# Patient Record
Sex: FEMALE | Race: WHITE | NOT HISPANIC OR LATINO | URBAN - METROPOLITAN AREA
[De-identification: names, ages, dates, MRNs, and addresses within clinical notes are randomized per-mention and may not be internally consistent; named-entity substitution may affect disease eponyms.]

---

## 2017-08-20 ENCOUNTER — EMERGENCY (EMERGENCY)
Facility: HOSPITAL | Age: 82
LOS: 1 days | Discharge: HOME | End: 2017-08-20
Attending: EMERGENCY MEDICINE | Admitting: EMERGENCY MEDICINE
Payer: MEDICARE

## 2017-08-20 VITALS
OXYGEN SATURATION: 99 % | DIASTOLIC BLOOD PRESSURE: 77 MMHG | TEMPERATURE: 99 F | HEART RATE: 86 BPM | SYSTOLIC BLOOD PRESSURE: 151 MMHG | RESPIRATION RATE: 18 BRPM

## 2017-08-20 VITALS
DIASTOLIC BLOOD PRESSURE: 89 MMHG | RESPIRATION RATE: 18 BRPM | SYSTOLIC BLOOD PRESSURE: 164 MMHG | TEMPERATURE: 98 F | HEART RATE: 79 BPM | OXYGEN SATURATION: 96 %

## 2017-08-20 DIAGNOSIS — S01.01XA LACERATION WITHOUT FOREIGN BODY OF SCALP, INITIAL ENCOUNTER: ICD-10-CM
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
## 2017-08-20 DIAGNOSIS — S09.90XA UNSPECIFIED INJURY OF HEAD, INITIAL ENCOUNTER: ICD-10-CM
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
## 2017-08-20 DIAGNOSIS — Z88.0 ALLERGY STATUS TO PENICILLIN: ICD-10-CM
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
## 2017-08-20 LAB
ALBUMIN SERPL ELPH-MCNC: 3.8 G/DL — SIGNIFICANT CHANGE UP (ref 3.3–5)
ALP SERPL-CCNC: 84 U/L — SIGNIFICANT CHANGE UP (ref 40–120)
ALT FLD-CCNC: 16 U/L — SIGNIFICANT CHANGE UP (ref 10–45)
ANION GAP SERPL CALC-SCNC: 11 MMOL/L — SIGNIFICANT CHANGE UP (ref 5–17)
APPEARANCE UR: CLEAR — SIGNIFICANT CHANGE UP
AST SERPL-CCNC: 28 U/L — SIGNIFICANT CHANGE UP (ref 10–40)
BASOPHILS NFR BLD AUTO: 0.3 % — SIGNIFICANT CHANGE UP (ref 0–2)
BILIRUB SERPL-MCNC: 0.2 MG/DL — SIGNIFICANT CHANGE UP (ref 0.2–1.2)
BILIRUB UR-MCNC: NEGATIVE — SIGNIFICANT CHANGE UP
BUN SERPL-MCNC: 25 MG/DL — HIGH (ref 7–23)
CALCIUM SERPL-MCNC: 9.2 MG/DL — SIGNIFICANT CHANGE UP (ref 8.4–10.5)
CHLORIDE SERPL-SCNC: 102 MMOL/L — SIGNIFICANT CHANGE UP (ref 96–108)
CO2 SERPL-SCNC: 26 MMOL/L — SIGNIFICANT CHANGE UP (ref 22–31)
COLOR SPEC: YELLOW — SIGNIFICANT CHANGE UP
CREAT SERPL-MCNC: 0.9 MG/DL — SIGNIFICANT CHANGE UP (ref 0.5–1.3)
DIFF PNL FLD: NEGATIVE — SIGNIFICANT CHANGE UP
EOSINOPHIL NFR BLD AUTO: 1.1 % — SIGNIFICANT CHANGE UP (ref 0–6)
GLUCOSE SERPL-MCNC: 176 MG/DL — HIGH (ref 70–99)
GLUCOSE UR QL: NEGATIVE — SIGNIFICANT CHANGE UP
HCT VFR BLD CALC: 33.3 % — LOW (ref 34.5–45)
HGB BLD-MCNC: 10.9 G/DL — LOW (ref 11.5–15.5)
KETONES UR-MCNC: NEGATIVE — SIGNIFICANT CHANGE UP
LEUKOCYTE ESTERASE UR-ACNC: NEGATIVE — SIGNIFICANT CHANGE UP
LYMPHOCYTES # BLD AUTO: 23.3 % — SIGNIFICANT CHANGE UP (ref 13–44)
MCHC RBC-ENTMCNC: 30.6 PG — SIGNIFICANT CHANGE UP (ref 27–34)
MCHC RBC-ENTMCNC: 32.7 G/DL — SIGNIFICANT CHANGE UP (ref 32–36)
MCV RBC AUTO: 93.5 FL — SIGNIFICANT CHANGE UP (ref 80–100)
MONOCYTES NFR BLD AUTO: 7.5 % — SIGNIFICANT CHANGE UP (ref 2–14)
NEUTROPHILS NFR BLD AUTO: 67.8 % — SIGNIFICANT CHANGE UP (ref 43–77)
NITRITE UR-MCNC: NEGATIVE — SIGNIFICANT CHANGE UP
PH UR: 5 — SIGNIFICANT CHANGE UP (ref 5–8)
PLATELET # BLD AUTO: 309 K/UL — SIGNIFICANT CHANGE UP (ref 150–400)
POTASSIUM SERPL-MCNC: 4.5 MMOL/L — SIGNIFICANT CHANGE UP (ref 3.5–5.3)
POTASSIUM SERPL-SCNC: 4.5 MMOL/L — SIGNIFICANT CHANGE UP (ref 3.5–5.3)
PROT SERPL-MCNC: 6.8 G/DL — SIGNIFICANT CHANGE UP (ref 6–8.3)
PROT UR-MCNC: NEGATIVE MG/DL — SIGNIFICANT CHANGE UP
RBC # BLD: 3.56 M/UL — LOW (ref 3.8–5.2)
RBC # FLD: 13.9 % — SIGNIFICANT CHANGE UP (ref 10.3–16.9)
SODIUM SERPL-SCNC: 139 MMOL/L — SIGNIFICANT CHANGE UP (ref 135–145)
SP GR SPEC: 1.01 — SIGNIFICANT CHANGE UP (ref 1–1.03)
TROPONIN T SERPL-MCNC: <0.01 NG/ML — SIGNIFICANT CHANGE UP (ref 0–0.01)
UROBILINOGEN FLD QL: 0.2 E.U./DL — SIGNIFICANT CHANGE UP
WBC # BLD: 6.3 K/UL — SIGNIFICANT CHANGE UP (ref 3.8–10.5)
WBC # FLD AUTO: 6.3 K/UL — SIGNIFICANT CHANGE UP (ref 3.8–10.5)

## 2017-08-20 PROCEDURE — 84484 ASSAY OF TROPONIN QUANT: CPT

## 2017-08-20 PROCEDURE — 36415 COLL VENOUS BLD VENIPUNCTURE: CPT

## 2017-08-20 PROCEDURE — 70450 CT HEAD/BRAIN W/O DYE: CPT | Mod: 26

## 2017-08-20 PROCEDURE — 99284 EMERGENCY DEPT VISIT MOD MDM: CPT | Mod: 25

## 2017-08-20 PROCEDURE — 85025 COMPLETE CBC W/AUTO DIFF WBC: CPT

## 2017-08-20 PROCEDURE — 80053 COMPREHEN METABOLIC PANEL: CPT

## 2017-08-20 PROCEDURE — 72125 CT NECK SPINE W/O DYE: CPT

## 2017-08-20 PROCEDURE — 81003 URINALYSIS AUTO W/O SCOPE: CPT

## 2017-08-20 PROCEDURE — 70450 CT HEAD/BRAIN W/O DYE: CPT

## 2017-08-20 PROCEDURE — 93005 ELECTROCARDIOGRAM TRACING: CPT

## 2017-08-20 PROCEDURE — 93010 ELECTROCARDIOGRAM REPORT: CPT

## 2017-08-20 PROCEDURE — 87086 URINE CULTURE/COLONY COUNT: CPT

## 2017-08-20 PROCEDURE — 99285 EMERGENCY DEPT VISIT HI MDM: CPT | Mod: 25

## 2017-08-20 PROCEDURE — 72125 CT NECK SPINE W/O DYE: CPT | Mod: 26

## 2017-08-20 NOTE — ED PROVIDER NOTE - PHYSICAL EXAMINATION
CONSTITUTIONAL: Well-appearing; well-nourished; in no apparent distress.   HEAD: 2cm lac to posterior scalp, mild oozing  EYES:  conjunctiva and sclera clear  ENT: normal nose; no rhinorrhea; normal pharynx with no erythema or lesions.   NECK: Supple; non-tender;   CARDIOVASCULAR: Normal S1, S2; no murmurs, rubs, or gallops. Regular rate and rhythm.   RESPIRATORY: Breathing easily; breath sounds clear and equal bilaterally; no wheezes, rhonchi, or rales.  GI: Soft; non-distended; non-tender; no palpable organomegaly.   EXT: No cyanosis or edema; N/V intact  SKIN: Normal for age and race; warm; dry; good turgor; no apparent lesions or rash.   NEURO: A & O x 3; face symmetric; grossly unremarkable.   PSYCHOLOGICAL: The patient’s mood and manner are appropriate.

## 2017-08-20 NOTE — ED PROVIDER NOTE - OBJECTIVE STATEMENT
89yo F here w/ fall today. +laceration to posterior scalp. 89yo F here w/ fall today. +laceration to posterior scalp. Lives alone, refusing HHA. Family thinks she has mild dementia and have taken over her finances but pt still cleans and cooks for herself. They call daily but not always able to physically come and check on her daily. Pt called them today and stated that she fell in the bathroom. At time of call she states that she slipped, and they corroborate that her bathroom is tiny and easy to fall in. However also concerned that she may not be eating well or minimizing symptoms. Have discussed getting help at home but pt always declines. Not leaving stove on, and pt still able to keep apartment clean and maintain her hygiene well.

## 2017-08-20 NOTE — ED ADULT NURSE NOTE - DISCHARGE TEACHING
Garrett, pt aware of stitches removal f/u , and referral for home care, pt and family verbalized understanding Garrett, pt aware of staples removal f/u in one week, and referral for home care, pt and family verbalized understanding

## 2017-08-20 NOTE — ED PROVIDER NOTE - CARE PLAN
Principal Discharge DX:	Head injury due to trauma, initial encounter  Secondary Diagnosis:	Scalp laceration, initial encounter

## 2017-08-20 NOTE — ED ADULT NURSE NOTE - CHPI ED SYMPTOMS NEG
no deformity/no vomiting/no loss of consciousness/no numbness/no tingling/no fever/no abrasion/no weakness

## 2017-08-20 NOTE — ED PROVIDER NOTE - MEDICAL DECISION MAKING DETAILS
here w/ fall w/ resultant head injury. workup in Ed negative. here w/ daughter and son in law. they feel like she is still safe at home, but concerned that she is at the point where home services would be helpful. I spoke w/ social work who will forward case to case management. DC home in NAD with strict return precautions given.

## 2017-08-20 NOTE — ED ADULT NURSE NOTE - OBJECTIVE STATEMENT
PT came to ED with adult children following mechanical fall in bathroom today. Pt states she lost her balance when she was getting up from toilet. Pt states she fell and hit back of her head and left shoulder.  PT denies LOC, small lac noted on occipital region, minor bleeding to site. Pt report 2/10 pain to left shoulder. Positive PMS x4 extremities, with full ROM. Pt denies chest pain, SOB, abd pain, /GI symptoms.  Per pt's daughter patient has baseline memory loss over the last several years. PT currently alert and oriented x3. Able to communicate in English with ED staff.  Daughter at bedside.

## 2017-08-20 NOTE — ED ADULT TRIAGE NOTE - CHIEF COMPLAINT QUOTE
fell in the bathroom this am and hit back of head ,small hematoma and abrasion on scalp ,pt denies any pain or dizziness

## 2017-08-21 LAB
CULTURE RESULTS: SIGNIFICANT CHANGE UP
SPECIMEN SOURCE: SIGNIFICANT CHANGE UP

## 2018-04-12 ENCOUNTER — INPATIENT (INPATIENT)
Facility: HOSPITAL | Age: 83
LOS: 10 days | Discharge: NURSING, REHAB OR LONG-TERM CARE | DRG: 377 | End: 2018-04-23
Attending: SPECIALIST | Admitting: SPECIALIST
Payer: MEDICARE

## 2018-04-12 VITALS
DIASTOLIC BLOOD PRESSURE: 50 MMHG | WEIGHT: 100.09 LBS | OXYGEN SATURATION: 99 % | HEIGHT: 60 IN | TEMPERATURE: 97 F | RESPIRATION RATE: 18 BRPM | SYSTOLIC BLOOD PRESSURE: 105 MMHG | HEART RATE: 76 BPM

## 2018-04-12 DIAGNOSIS — M48.00 SPINAL STENOSIS, SITE UNSPECIFIED: ICD-10-CM

## 2018-04-12 DIAGNOSIS — R63.8 OTHER SYMPTOMS AND SIGNS CONCERNING FOOD AND FLUID INTAKE: ICD-10-CM

## 2018-04-12 DIAGNOSIS — M06.9 RHEUMATOID ARTHRITIS, UNSPECIFIED: ICD-10-CM

## 2018-04-12 DIAGNOSIS — D64.9 ANEMIA, UNSPECIFIED: ICD-10-CM

## 2018-04-12 DIAGNOSIS — I10 ESSENTIAL (PRIMARY) HYPERTENSION: ICD-10-CM

## 2018-04-12 LAB
ALBUMIN SERPL ELPH-MCNC: 3.6 G/DL — SIGNIFICANT CHANGE UP (ref 3.3–5)
ALP SERPL-CCNC: 66 U/L — SIGNIFICANT CHANGE UP (ref 40–120)
ALT FLD-CCNC: 15 U/L — SIGNIFICANT CHANGE UP (ref 10–45)
ANION GAP SERPL CALC-SCNC: 12 MMOL/L — SIGNIFICANT CHANGE UP (ref 5–17)
APTT BLD: 28.5 SEC — SIGNIFICANT CHANGE UP (ref 27.5–37.4)
AST SERPL-CCNC: 27 U/L — SIGNIFICANT CHANGE UP (ref 10–40)
BILIRUB SERPL-MCNC: <0.2 MG/DL — SIGNIFICANT CHANGE UP (ref 0.2–1.2)
BLD GP AB SCN SERPL QL: NEGATIVE — SIGNIFICANT CHANGE UP
BUN SERPL-MCNC: 40 MG/DL — HIGH (ref 7–23)
CALCIUM SERPL-MCNC: 9.3 MG/DL — SIGNIFICANT CHANGE UP (ref 8.4–10.5)
CHLORIDE SERPL-SCNC: 103 MMOL/L — SIGNIFICANT CHANGE UP (ref 96–108)
CO2 SERPL-SCNC: 24 MMOL/L — SIGNIFICANT CHANGE UP (ref 22–31)
CREAT SERPL-MCNC: 1.1 MG/DL — SIGNIFICANT CHANGE UP (ref 0.5–1.3)
EXTRA SST TUBE: SIGNIFICANT CHANGE UP
GLUCOSE SERPL-MCNC: 123 MG/DL — HIGH (ref 70–99)
HCT VFR BLD CALC: 22.4 % — LOW (ref 34.5–45)
HGB BLD-MCNC: 7.1 G/DL — LOW (ref 11.5–15.5)
INR BLD: 0.97 — SIGNIFICANT CHANGE UP (ref 0.88–1.16)
LYMPHOCYTES # BLD AUTO: 11 % — LOW (ref 13–44)
MCHC RBC-ENTMCNC: 28.2 PG — SIGNIFICANT CHANGE UP (ref 27–34)
MCHC RBC-ENTMCNC: 31.7 G/DL — LOW (ref 32–36)
MCV RBC AUTO: 88.9 FL — SIGNIFICANT CHANGE UP (ref 80–100)
MONOCYTES NFR BLD AUTO: 1 % — LOW (ref 2–14)
NEUTROPHILS NFR BLD AUTO: 86 % — HIGH (ref 43–77)
PLATELET # BLD AUTO: 500 K/UL — HIGH (ref 150–400)
POTASSIUM SERPL-MCNC: 5 MMOL/L — SIGNIFICANT CHANGE UP (ref 3.5–5.3)
POTASSIUM SERPL-SCNC: 5 MMOL/L — SIGNIFICANT CHANGE UP (ref 3.5–5.3)
PROT SERPL-MCNC: 6.6 G/DL — SIGNIFICANT CHANGE UP (ref 6–8.3)
PROTHROM AB SERPL-ACNC: 10.8 SEC — SIGNIFICANT CHANGE UP (ref 9.8–12.7)
RBC # BLD: 2.52 M/UL — LOW (ref 3.8–5.2)
RBC # FLD: 14.9 % — SIGNIFICANT CHANGE UP (ref 10.3–16.9)
RH IG SCN BLD-IMP: POSITIVE — SIGNIFICANT CHANGE UP
SODIUM SERPL-SCNC: 139 MMOL/L — SIGNIFICANT CHANGE UP (ref 135–145)
WBC # BLD: 11.3 K/UL — HIGH (ref 3.8–10.5)
WBC # FLD AUTO: 11.3 K/UL — HIGH (ref 3.8–10.5)

## 2018-04-12 PROCEDURE — 99285 EMERGENCY DEPT VISIT HI MDM: CPT

## 2018-04-12 RX ORDER — ACETAMINOPHEN 500 MG
0 TABLET ORAL
Qty: 0 | Refills: 0 | COMMUNITY

## 2018-04-12 RX ORDER — SODIUM CHLORIDE 9 MG/ML
1000 INJECTION INTRAMUSCULAR; INTRAVENOUS; SUBCUTANEOUS ONCE
Qty: 0 | Refills: 0 | Status: COMPLETED | OUTPATIENT
Start: 2018-04-12 | End: 2018-04-12

## 2018-04-12 RX ORDER — PANTOPRAZOLE SODIUM 20 MG/1
40 TABLET, DELAYED RELEASE ORAL
Qty: 0 | Refills: 0 | Status: DISCONTINUED | OUTPATIENT
Start: 2018-04-12 | End: 2018-04-23

## 2018-04-12 RX ORDER — LOSARTAN POTASSIUM 100 MG/1
1 TABLET, FILM COATED ORAL
Qty: 0 | Refills: 0 | COMMUNITY

## 2018-04-12 RX ORDER — DULOXETINE HYDROCHLORIDE 30 MG/1
60 CAPSULE, DELAYED RELEASE ORAL
Qty: 0 | Refills: 0 | COMMUNITY

## 2018-04-12 RX ORDER — CELECOXIB 200 MG/1
0 CAPSULE ORAL
Qty: 0 | Refills: 0 | COMMUNITY

## 2018-04-12 RX ORDER — ACETAMINOPHEN 500 MG
650 TABLET ORAL ONCE
Qty: 0 | Refills: 0 | Status: COMPLETED | OUTPATIENT
Start: 2018-04-12 | End: 2018-04-12

## 2018-04-12 RX ORDER — ACETAMINOPHEN 500 MG
325 TABLET ORAL
Qty: 0 | Refills: 0 | COMMUNITY

## 2018-04-12 RX ADMIN — SODIUM CHLORIDE 2000 MILLILITER(S): 9 INJECTION INTRAMUSCULAR; INTRAVENOUS; SUBCUTANEOUS at 16:43

## 2018-04-12 RX ADMIN — Medication 650 MILLIGRAM(S): at 17:38

## 2018-04-12 RX ADMIN — SODIUM CHLORIDE 2000 MILLILITER(S): 9 INJECTION INTRAMUSCULAR; INTRAVENOUS; SUBCUTANEOUS at 17:38

## 2018-04-12 NOTE — ED ADULT TRIAGE NOTE - OTHER COMPLAINTS
Pt with weakness, no CP/SOB, no blood in vomit or stool Pt with weakness, no CP/SOB, no blood in vomit or stool; no blood thinnners

## 2018-04-12 NOTE — PROGRESS NOTE ADULT - SUBJECTIVE AND OBJECTIVE BOX
GERONIMO MACK    HPI:89 yo f with pmx of alzheimers, htn, spinal stenosis went for routine exam by pcp and was found to be anemic so sent to er.  no c/o abd pain/blood in stool/n/v      Allergies    penicillin (Unknown)    Intolerances        sodium chloride 0.9% Bolus 1000 milliLiter(s) IV Bolus once      Vital Signs Last 24 Hrs  T(C): 36.6 (12 Apr 2018 16:22), Max: 36.6 (12 Apr 2018 16:22)  T(F): 97.9 (12 Apr 2018 16:22), Max: 97.9 (12 Apr 2018 16:22)  HR: 63 (12 Apr 2018 16:22) (63 - 76)  BP: 134/71 (12 Apr 2018 16:22) (105/50 - 134/71)  BP(mean): --  RR: 18 (12 Apr 2018 16:22) (18 - 18)  SpO2: 100% (12 Apr 2018 16:22) (99% - 100%)    PHYSICAL EXAM:      Constitutional:nad    Respiratory:cta    Cardiovascular:[id number]    Gastrointestinal:soft nt bs    Rectal:formed stools  gpos      A/P    transfuse prbc to get hgb >8  no nsaids  for egd/colon mon am

## 2018-04-12 NOTE — H&P ADULT - PROBLEM SELECTOR PLAN 1
-Hb 7.1 in setting of positive FOBT; evaluated by Dr. Quezada in ED who scheduled patient for colonoscopy on Monday morning at 8:30 AM  -clear liquid diet until c-scope  -ordered for 1unit PRBCs, transfusion goal >8  -f/u post-transfusion CBC  -NPO after midnight Sunday -Hb 7.1 in setting of positive FOBT; evaluated by Dr. Quezada in ED who scheduled patient for colonoscopy on Monday morning at 8:30 AM  -full liquid diet until Sunday, then transition to clear liquids and Golytely prep  -protonix 40 PO daily per Dr. Quezada  -ordered for 1unit PRBCs, transfusion goal >8  -f/u post-transfusion CBC  -NPO after midnight Sunday

## 2018-04-12 NOTE — ED PROVIDER NOTE - ATTENDING CONTRIBUTION TO CARE
PT sent from PCP for anemia, feeling tired for months, no sob.  Denies gi bleeding or other blood loss.  Pt well, low nl bp.  HR stable.  Pt pale, nad, belly nontender.  Plan labs, likely admit for tx and bowman.

## 2018-04-12 NOTE — H&P ADULT - NSHPSOCIALHISTORY_GEN_ALL_CORE
Never smoked  No alcohol use  No drug use  Lives alone at home with home aide 6 days/week  Ambulates w/ and w/o cane

## 2018-04-12 NOTE — H&P ADULT - ASSESSMENT
89F PMH spinal stenosis, RA, HTN, Alzheimer's presented to [facility] from outpatient clinic with low hemoglobin, found to have Hb 7.1 with positive FOBT, scheduled for colonoscopy on Monday.

## 2018-04-12 NOTE — H&P ADULT - NSHPLABSRESULTS_GEN_ALL_CORE
.  LABS:                         7.1    11.3  )-----------( 500      ( 12 Apr 2018 16:19 )             22.4     04-12    139  |  103  |  40<H>  ----------------------------<  123<H>  5.0   |  24  |  1.10    Ca    9.3      12 Apr 2018 16:19    TPro  6.6  /  Alb  3.6  /  TBili  <0.2  /  DBili  x   /  AST  27  /  ALT  15  /  AlkPhos  66  04-12    PT/INR - ( 12 Apr 2018 16:19 )   PT: 10.8 sec;   INR: 0.97          PTT - ( 12 Apr 2018 16:19 )  PTT:28.5 sec              RADIOLOGY, EKG & ADDITIONAL TESTS: Reviewed.

## 2018-04-12 NOTE — H&P ADULT - PMH
Diverticulitis of colon  Diverticulitis  Rheumatoid arthritis Alzheimer disease    Diverticulitis of colon  Diverticulitis  Hypertension    Rheumatoid arthritis    Spinal stenosis

## 2018-04-12 NOTE — ED PROVIDER NOTE - MEDICAL DECISION MAKING DETAILS
89 yo F with pmh of alzheimers dz and RA sent in by Dr. Sow for Hgb of 6.4.  Admits to fatigue. No BRBPR. Pt pale, thin, NAD. Cardio rrr, nml s1s2. Lungs cta b/l. Labs, consent for blood transfusion.

## 2018-04-12 NOTE — H&P ADULT - PROBLEM SELECTOR PLAN 5
-clear liquid diet; NPO after 12AM Monday  -Replete lytes PRN  -No IVFs  -no HSQ in setting of bleed    Dispo: [facility] -full liquid diet; NPO after 12AM Monday  -Replete lytes PRN  -No IVFs  -no HSQ in setting of bleed    Dispo: [facility]

## 2018-04-12 NOTE — H&P ADULT - PROBLEM SELECTOR PLAN 2
-c/w tylenol 325 mg q6h PRN for moderate pain  -does not follow with outpatient rheumatologist  -has taken Celebrex in past but per daughter, has not used in a long time, unable to specify when d/c-ed

## 2018-04-12 NOTE — H&P ADULT - HISTORY OF PRESENT ILLNESS
89F PMH 89F PMH spinal stenosis, RA, HTN, Alzheimer's presented to [facility] from outpatient clinic with low hemoglobin. History primarily obtained from patient's son in law and patient. Patient was at routine check up earlier today, found to have low hemoglobin with no signs of bleeding noted at home. She has been weak for the past day, however no blood noted in the stool, no darkened stools, no hematemesis/hemoptysis. Denies symptoms of dizziness, headache, SOB, chest pain, abdominal pain, N/V, fevers chills. Has not taken NSAIDs, not on any blood thinners at home. Takes tylenol for arthritic pain and spinal stenosis pain, which is currently not well controlled as she is laying in bed uncomfortably. Has never been transfused blood, never had blood in the stool. Decreased PO intake recently due to lack of sleep/ decreased taste sensation. Patient fell at home 8 days ago, has bruise L forehead. Family has not noticed any neurological deficits or change in mental status. On assessment by GI in the ED, FOBT was positive, and patient admitted for colonoscopy on  Monday.     In the ED, VS were T 97, HR 76, /50, RR 18, O2 99% on RA. Labs significant for Hb 7.1, WBC 11.3, 86% neutrophils, normal coag profile, BUN 40.  Given 650mg tylenol, 1L NS bolus, ordered for 1unit RBCs.

## 2018-04-12 NOTE — ED PROVIDER NOTE - OBJECTIVE STATEMENT
89 yo F with pmh of alzheimers dz and RA sent in by Dr. Sow for Hgb of 6.4. Pt had routine blood work yesterday. Reports feeling tired for months. Denies cp, sob, ha, dizziness, n/v, blood in the stool, hematuria or dark stools. Denies hx of transfusion in the past.

## 2018-04-12 NOTE — H&P ADULT - NSHPPHYSICALEXAM_GEN_ALL_CORE
.  VITAL SIGNS:  T(C): 36.6 (04-12-18 @ 16:22), Max: 36.6 (04-12-18 @ 16:22)  T(F): 97.9 (04-12-18 @ 16:22), Max: 97.9 (04-12-18 @ 16:22)  HR: 63 (04-12-18 @ 16:22) (63 - 76)  BP: 134/71 (04-12-18 @ 16:22) (105/50 - 134/71)  BP(mean): --  RR: 18 (04-12-18 @ 16:22) (18 - 18)  SpO2: 100% (04-12-18 @ 16:22) (99% - 100%)  Wt(kg): --    PHYSICAL EXAM:    Constitutional: WDWN resting comfortably in bed; NAD  Head: NC/AT  Eyes: PERRL, EOMI, anicteric sclera  ENT: no nasal discharge; uvula midline, no oropharyngeal erythema or exudates; MMM  Neck: supple; no JVD or thyromegaly  Respiratory: CTA B/L; no W/R/R, no retractions  Cardiac: +S1/S2; RRR; no M/R/G; PMI non-displaced  Gastrointestinal: abdomen soft, NT/ND; no rebound or guarding; +BSx4  Back: spine midline, no bony tenderness or step-offs; no CVAT B/L  Extremities: WWP, no clubbing or cyanosis; no peripheral edema  Musculoskeletal: NROM x4; no joint swelling, tenderness or erythema  Vascular: 2+ radial, femoral, DP/PT pulses B/L  Dermatologic: skin warm, dry and intact; no rashes, wounds, or scars  Lymphatic: no submandibular or cervical LAD  Neurologic: AAOx3; CNII-XII grossly intact; no focal deficits  Psychiatric: affect and characteristics of appearance, verbalizations, behaviors are appropriate .  VITAL SIGNS:  T(C): 36.6 (04-12-18 @ 16:22), Max: 36.6 (04-12-18 @ 16:22)  T(F): 97.9 (04-12-18 @ 16:22), Max: 97.9 (04-12-18 @ 16:22)  HR: 63 (04-12-18 @ 16:22) (63 - 76)  BP: 134/71 (04-12-18 @ 16:22) (105/50 - 134/71)  BP(mean): --  RR: 18 (04-12-18 @ 16:22) (18 - 18)  SpO2: 100% (04-12-18 @ 16:22) (99% - 100%)  Wt(kg): --    PHYSICAL EXAM:  Constitutional: WDWN resting comfortably in bed; NAD  Head: healing bruise/erosion over L temporal region near lateral eyebrow  Eyes: PERRL, EOMI, anicteric sclera  ENT: no nasal discharge; uvula midline, no oropharyngeal erythema or exudates; DMM, no sublingual icterus  Neck: supple; no JVD or thyromegaly  Respiratory: mild bibasilar crackles; no W/R/R, no retractions  Cardiac: +S1/S2; RRR; no M/R/G; PMI non-displaced  Gastrointestinal: abdomen soft, NT/ND; no rebound or guarding; +BSx4  Back: tender to palpation of lower back  Extremities: WWP, no clubbing or cyanosis; no peripheral edema  Musculoskeletal: NROM x4; no joint swelling, tenderness or erythema  Vascular: 2+ radial, femoral, DP/PT pulses B/L  Dermatologic: skin warm, dry and intact; no rashes, wounds, or scars  Lymphatic: no submandibular or cervical LAD  Neurologic: AAOx3; CNII-XII grossly intact; no focal deficits  Psychiatric: affect and characteristics of appearance, verbalizations, behaviors are appropriate

## 2018-04-12 NOTE — H&P ADULT - PROBLEM SELECTOR PLAN 3
-c/w tylenol 325 mg q6h PRN for moderate pain, mostly in lower back sometimes radiates to calves  -pain is more tolerable when patient is sitting up in chair

## 2018-04-12 NOTE — ED PROVIDER NOTE - PHYSICAL EXAMINATION
CONSTITUTIONAL: thin elderly female sitting in bed, pale, NAD   HEAD: Normocephalic; atraumatic.   EYES: PERRL; EOM intact; pale conjunctiva   ENT: normal nose; no rhinorrhea; normal pharynx with no erythema or lesions.   NECK: Supple; non-tender; no LAD  CARDIOVASCULAR: Normal S1, S2; no murmurs, rubs, or gallops. Regular rate and rhythm.   RESPIRATORY: Breathing easily; breath sounds clear and equal bilaterally; no wheezes, rhonchi, or rales.  GI: Soft; non-distended; non-tender; no palpable organomegaly.   MSK: FROM at all extremities, normal tone   EXT: No cyanosis or edema; N/V intact  SKIN: Normal for age and race; warm; dry; good turgor; no apparent lesions or rash.

## 2018-04-12 NOTE — ED ADULT NURSE NOTE - CHPI ED SYMPTOMS NEG
no chills/no fever/no cough/no syncope/no nausea/no vomiting/no chest pain/no shortness of breath/no back pain/no diaphoresis

## 2018-04-13 LAB
ANION GAP SERPL CALC-SCNC: 12 MMOL/L — SIGNIFICANT CHANGE UP (ref 5–17)
BASOPHILS NFR BLD AUTO: 0.2 % — SIGNIFICANT CHANGE UP (ref 0–2)
BLD GP AB SCN SERPL QL: NEGATIVE — SIGNIFICANT CHANGE UP
BUN SERPL-MCNC: 22 MG/DL — SIGNIFICANT CHANGE UP (ref 7–23)
CALCIUM SERPL-MCNC: 8.4 MG/DL — SIGNIFICANT CHANGE UP (ref 8.4–10.5)
CHLORIDE SERPL-SCNC: 99 MMOL/L — SIGNIFICANT CHANGE UP (ref 96–108)
CO2 SERPL-SCNC: 23 MMOL/L — SIGNIFICANT CHANGE UP (ref 22–31)
CREAT SERPL-MCNC: 0.76 MG/DL — SIGNIFICANT CHANGE UP (ref 0.5–1.3)
EOSINOPHIL NFR BLD AUTO: 0.2 % — SIGNIFICANT CHANGE UP (ref 0–6)
GLUCOSE SERPL-MCNC: 116 MG/DL — HIGH (ref 70–99)
HCT VFR BLD CALC: 24.6 % — LOW (ref 34.5–45)
HCT VFR BLD CALC: 29.1 % — LOW (ref 34.5–45)
HGB BLD-MCNC: 7.8 G/DL — LOW (ref 11.5–15.5)
HGB BLD-MCNC: 9.4 G/DL — LOW (ref 11.5–15.5)
LYMPHOCYTES # BLD AUTO: 11.6 % — LOW (ref 13–44)
MAGNESIUM SERPL-MCNC: 2 MG/DL — SIGNIFICANT CHANGE UP (ref 1.6–2.6)
MCHC RBC-ENTMCNC: 28.1 PG — SIGNIFICANT CHANGE UP (ref 27–34)
MCHC RBC-ENTMCNC: 28.3 PG — SIGNIFICANT CHANGE UP (ref 27–34)
MCHC RBC-ENTMCNC: 31.7 G/DL — LOW (ref 32–36)
MCHC RBC-ENTMCNC: 32.3 G/DL — SIGNIFICANT CHANGE UP (ref 32–36)
MCV RBC AUTO: 87.1 FL — SIGNIFICANT CHANGE UP (ref 80–100)
MCV RBC AUTO: 89.1 FL — SIGNIFICANT CHANGE UP (ref 80–100)
MONOCYTES NFR BLD AUTO: 5.1 % — SIGNIFICANT CHANGE UP (ref 2–14)
NEUTROPHILS NFR BLD AUTO: 82.9 % — HIGH (ref 43–77)
PLATELET # BLD AUTO: 405 K/UL — HIGH (ref 150–400)
PLATELET # BLD AUTO: 410 K/UL — HIGH (ref 150–400)
POTASSIUM SERPL-MCNC: 4.3 MMOL/L — SIGNIFICANT CHANGE UP (ref 3.5–5.3)
POTASSIUM SERPL-SCNC: 4.3 MMOL/L — SIGNIFICANT CHANGE UP (ref 3.5–5.3)
RBC # BLD: 2.76 M/UL — LOW (ref 3.8–5.2)
RBC # BLD: 3.34 M/UL — LOW (ref 3.8–5.2)
RBC # FLD: 14.2 % — SIGNIFICANT CHANGE UP (ref 10.3–16.9)
RBC # FLD: 14.5 % — SIGNIFICANT CHANGE UP (ref 10.3–16.9)
RH IG SCN BLD-IMP: POSITIVE — SIGNIFICANT CHANGE UP
SODIUM SERPL-SCNC: 134 MMOL/L — LOW (ref 135–145)
WBC # BLD: 10.7 K/UL — HIGH (ref 3.8–10.5)
WBC # BLD: 9.7 K/UL — SIGNIFICANT CHANGE UP (ref 3.8–10.5)
WBC # FLD AUTO: 10.7 K/UL — HIGH (ref 3.8–10.5)
WBC # FLD AUTO: 9.7 K/UL — SIGNIFICANT CHANGE UP (ref 3.8–10.5)

## 2018-04-13 PROCEDURE — 71045 X-RAY EXAM CHEST 1 VIEW: CPT | Mod: 26

## 2018-04-13 RX ORDER — SOD SULF/SODIUM/NAHCO3/KCL/PEG
4000 SOLUTION, RECONSTITUTED, ORAL ORAL ONCE
Qty: 0 | Refills: 0 | Status: COMPLETED | OUTPATIENT
Start: 2018-04-15 | End: 2018-04-15

## 2018-04-13 RX ORDER — ACETAMINOPHEN 500 MG
325 TABLET ORAL EVERY 6 HOURS
Qty: 0 | Refills: 0 | Status: DISCONTINUED | OUTPATIENT
Start: 2018-04-13 | End: 2018-04-21

## 2018-04-13 RX ORDER — ACETAMINOPHEN 500 MG
325 TABLET ORAL ONCE
Qty: 0 | Refills: 0 | Status: COMPLETED | OUTPATIENT
Start: 2018-04-13 | End: 2018-04-13

## 2018-04-13 RX ADMIN — Medication 325 MILLIGRAM(S): at 04:13

## 2018-04-13 RX ADMIN — Medication 325 MILLIGRAM(S): at 05:00

## 2018-04-13 RX ADMIN — PANTOPRAZOLE SODIUM 40 MILLIGRAM(S): 20 TABLET, DELAYED RELEASE ORAL at 06:13

## 2018-04-13 RX ADMIN — Medication 325 MILLIGRAM(S): at 03:30

## 2018-04-13 RX ADMIN — Medication 325 MILLIGRAM(S): at 02:45

## 2018-04-13 NOTE — PROGRESS NOTE ADULT - ASSESSMENT
89F PMH spinal stenosis, RA, HTN, Alzheimer's presented to [facility] from outpatient clinic with low hemoglobin, found to have Hb 7.1 with positive FOBT, scheduled for colonoscopy on Monday.

## 2018-04-13 NOTE — PROGRESS NOTE ADULT - SUBJECTIVE AND OBJECTIVE BOX
GERONIMO MACK    HPI:  89F [facility] spinal stenosis, RA, HTN, Alzheimer's presented to [facility] from outpatient clinic with low hemoglobin. History primarily obtained from patient's son in law and patient. Patient was at routine check up earlier today, found to have low hemoglobin with no signs of bleeding noted at home. She has been weak for the past day, however no blood noted in the stool, no darkened stools, no hematemesis/hemoptysis. Denies symptoms of dizziness, headache, SOB, chest pain, abdominal pain, N/V, fevers chills. Has not taken NSAIDs, not on any blood thinners at home. Takes tylenol for arthritic pain and spinal stenosis pain, which is currently not well controlled as she is laying in bed uncomfortably. Has never been transfused blood, never had blood in the stool. Decreased PO intake recently due to lack of sleep/ decreased taste sensation. Patient fell at home 8 days ago, has bruise L forehead. Family has not noticed any neurological deficits or change in mental status. On assessment by GI in the ED, FOBT was positive, and patient admitted for colonoscopy on  Monday.     In the ED, VS were T 97, HR 76, /50, RR 18, O2 99% on RA. Labs significant for Hb 7.1, WBC 11.3, 86% neutrophils, normal coag profile, BUN 40.  Given 650mg tylenol, 1L NS bolus, ordered for 1unit RBCs. (12 Apr 2018 18:31)    s/p prbc.  noacute events    Allergies    penicillin (Unknown)    Intolerances        acetaminophen   Tablet. 325 milliGRAM(s) Oral every 6 hours PRN  pantoprazole    Tablet 40 milliGRAM(s) Oral before breakfast      Vital Signs Last 24 Hrs  T(C): 36.9 (13 Apr 2018 05:10), Max: 37.2 (12 Apr 2018 22:07)  T(F): 98.5 (13 Apr 2018 05:10), Max: 99 (12 Apr 2018 22:07)  HR: 69 (13 Apr 2018 05:10) (63 - 82)  BP: 145/73 (13 Apr 2018 05:10) (105/50 - 167/82)  BP(mean): --  RR: 19 (13 Apr 2018 05:10) (17 - 19)  SpO2: 99% (13 Apr 2018 05:10) (99% - 100%)    PHYSICAL EXAM:      Constitutional:nad    Respiratory:cta    Cardiovascular:[id number]    Gastrointestinal:soft nt                                7.8    9.7   )-----------( 405      ( 13 Apr 2018 02:35 )             24.6       04-12    139  |  103  |  40<H>  ----------------------------<  123<H>  5.0   |  24  |  1.10    Ca    9.3      12 Apr 2018 16:19    TPro  6.6  /  Alb  3.6  /  TBili  <0.2  /  DBili  x   /  AST  27  /  ALT  15  /  AlkPhos  66  04-12      A/P  monitor h/h.    transfuse to get hgb>8.5  po ppi  egd/colon to be scheduled for mon

## 2018-04-13 NOTE — PROGRESS NOTE ADULT - PROBLEM SELECTOR PLAN 2
-c/w tylenol 325 mg q6h PRN for moderate pain  -does not follow with outpatient rheumatologist  -has taken Celebrex in past but per daughter, has not used in a long time, unable to specify when d/c-ed  -per Dr. Sow, may have been taking meloxicam at home

## 2018-04-13 NOTE — PROGRESS NOTE ADULT - PROBLEM SELECTOR PLAN 5
-full liquid diet; NPO after 12AM Monday  -Replete lytes PRN  -No IVFs  -no HSQ in setting of bleed    Dispo: [facility]

## 2018-04-13 NOTE — PROGRESS NOTE ADULT - PROBLEM SELECTOR PLAN 1
-Hb 7.1 in setting of positive FOBT; evaluated by Dr. Quezada in ED who scheduled patient for colonoscopy on Monday morning at 8:30 AM  -full liquid diet until Sunday, then transition to clear liquids and Golytely prep  -protonix 40 PO daily per Dr. Quezada  -s/p 2u RBCs, transfusion goal >8.5  -f/u post-transfusion CBC  -NPO after midnight Sunday  -f/u iron studies

## 2018-04-13 NOTE — PROGRESS NOTE ADULT - SUBJECTIVE AND OBJECTIVE BOX
INTERVAL HPI/OVERNIGHT EVENTS:  Transfused 2u hgb overnight. Continued back pain.    SUBJECTIVE: Patient seen and examined at bedside. Reports continued back pain. Patient is pleasant and smiling, nodding yes to a majority of questions regarding chest pain, SOB, abdominal pain.     OBJECTIVE:    VITAL SIGNS:  ICU Vital Signs Last 24 Hrs  T(C): 36.6 (13 Apr 2018 08:44), Max: 37.2 (12 Apr 2018 22:07)  T(F): 97.9 (13 Apr 2018 08:44), Max: 99 (12 Apr 2018 22:07)  HR: 79 (13 Apr 2018 08:44) (63 - 82)  BP: 167/87 (13 Apr 2018 08:44) (105/50 - 173/83)  BP(mean): --  ABP: --  ABP(mean): --  RR: 17 (13 Apr 2018 08:44) (17 - 19)  SpO2: 99% (13 Apr 2018 08:44) (99% - 100%)        CAPILLARY BLOOD GLUCOSE          PHYSICAL EXAM:  Constitutional: WDWN resting comfortably in bed; NAD  	Head: healing bruise/erosion over L temporal region near lateral eyebrow  	Eyes: PERRL, EOMI, anicteric sclera  	ENT: no nasal discharge; uvula midline, no oropharyngeal erythema or exudates; DMM, no sublingual icterus  	Neck: supple; no JVD or thyromegaly  	Respiratory: mild bibasilar crackles; no W/R/R, no retractions  	Cardiac: +S1/S2; RRR; no M/R/G; PMI non-displaced  	Gastrointestinal: abdomen soft, NT/ND; no rebound or guarding; +BSx4  	Back: tender to palpation of lower back  	Extremities: WWP, no clubbing or cyanosis; no peripheral edema  	Vascular: 2+ radial, femoral, DP/PT pulses B/L  	Dermatologic: skin warm, dry and intact; no rashes, wounds, or scars  	Neurologic: AAOx1-2; no focal deficits      MEDICATIONS:  MEDICATIONS  (STANDING):  pantoprazole    Tablet 40 milliGRAM(s) Oral before breakfast    MEDICATIONS  (PRN):  acetaminophen   Tablet. 325 milliGRAM(s) Oral every 6 hours PRN Mild Pain (1 - 3)      ALLERGIES:  Allergies    penicillin (Unknown)    Intolerances        LABS:                        7.8    9.7   )-----------( 405      ( 13 Apr 2018 02:35 )             24.6     04-12    139  |  103  |  40<H>  ----------------------------<  123<H>  5.0   |  24  |  1.10    Ca    9.3      12 Apr 2018 16:19    TPro  6.6  /  Alb  3.6  /  TBili  <0.2  /  DBili  x   /  AST  27  /  ALT  15  /  AlkPhos  66  04-12    PT/INR - ( 12 Apr 2018 16:19 )   PT: 10.8 sec;   INR: 0.97          PTT - ( 12 Apr 2018 16:19 )  PTT:28.5 sec      RADIOLOGY & ADDITIONAL TESTS: Reviewed. INTERVAL HPI/OVERNIGHT EVENTS:  Transfused 2u hgb overnight. Continued back pain.    SUBJECTIVE: Patient seen and examined at bedside. Reports continued back pain. Patient is pleasant and smiling, nodding yes to a majority of questions regarding chest pain, SOB, abdominal pain.     OBJECTIVE:    VITAL SIGNS:  ICU Vital Signs Last 24 Hrs  T(C): 36.6 (13 Apr 2018 08:44), Max: 37.2 (12 Apr 2018 22:07)  T(F): 97.9 (13 Apr 2018 08:44), Max: 99 (12 Apr 2018 22:07)  HR: 79 (13 Apr 2018 08:44) (63 - 82)  BP: 167/87 (13 Apr 2018 08:44) (105/50 - 173/83)  BP(mean): --  ABP: --  ABP(mean): --  RR: 17 (13 Apr 2018 08:44) (17 - 19)  SpO2: 99% (13 Apr 2018 08:44) (99% - 100%)        CAPILLARY BLOOD GLUCOSE          PHYSICAL EXAM:  Constitutional: WDWN resting comfortably in bed; NAD  	Head: healing bruise/erosion over L temporal region near lateral eyebrow  	Eyes: PERRL, EOMI, anicteric sclera  	ENT: no nasal discharge; uvula midline, no oropharyngeal erythema or exudates; DMM, no sublingual icterus  	Neck: supple; no JVD or thyromegaly  	Respiratory: mild bibasilar crackles; no W/R/R, no retractions  	Cardiac: +S1/S2; RRR; no M/R/G; PMI non-displaced  	Gastrointestinal: abdomen soft, NT/ND; no rebound or guarding; +BSx4  	Back: tender to palpation of lower back  	Extremities: WWP, no clubbing or cyanosis; no peripheral edema  	Vascular: 2+ radial, femoral, DP/PT pulses B/L  	Dermatologic: skin warm, dry and intact; no rashes, wounds, or scars  	Neurologic: AAOx1; no focal deficits      MEDICATIONS:  MEDICATIONS  (STANDING):  pantoprazole    Tablet 40 milliGRAM(s) Oral before breakfast    MEDICATIONS  (PRN):  acetaminophen   Tablet. 325 milliGRAM(s) Oral every 6 hours PRN Mild Pain (1 - 3)      ALLERGIES:  Allergies    penicillin (Unknown)    Intolerances        LABS:                        7.8    9.7   )-----------( 405      ( 13 Apr 2018 02:35 )             24.6     04-12    139  |  103  |  40<H>  ----------------------------<  123<H>  5.0   |  24  |  1.10    Ca    9.3      12 Apr 2018 16:19    TPro  6.6  /  Alb  3.6  /  TBili  <0.2  /  DBili  x   /  AST  27  /  ALT  15  /  AlkPhos  66  04-12    PT/INR - ( 12 Apr 2018 16:19 )   PT: 10.8 sec;   INR: 0.97          PTT - ( 12 Apr 2018 16:19 )  PTT:28.5 sec      RADIOLOGY & ADDITIONAL TESTS: Reviewed.

## 2018-04-14 LAB
ANION GAP SERPL CALC-SCNC: 13 MMOL/L — SIGNIFICANT CHANGE UP (ref 5–17)
BUN SERPL-MCNC: 19 MG/DL — SIGNIFICANT CHANGE UP (ref 7–23)
CALCIUM SERPL-MCNC: 8.8 MG/DL — SIGNIFICANT CHANGE UP (ref 8.4–10.5)
CHLORIDE SERPL-SCNC: 100 MMOL/L — SIGNIFICANT CHANGE UP (ref 96–108)
CO2 SERPL-SCNC: 23 MMOL/L — SIGNIFICANT CHANGE UP (ref 22–31)
CREAT SERPL-MCNC: 0.79 MG/DL — SIGNIFICANT CHANGE UP (ref 0.5–1.3)
FERRITIN SERPL-MCNC: 124 NG/ML — SIGNIFICANT CHANGE UP (ref 15–150)
GLUCOSE SERPL-MCNC: 105 MG/DL — HIGH (ref 70–99)
HCT VFR BLD CALC: 30.8 % — LOW (ref 34.5–45)
HGB BLD-MCNC: 10 G/DL — LOW (ref 11.5–15.5)
IRON SATN MFR SERPL: 14 UG/DL — LOW (ref 30–160)
IRON SATN MFR SERPL: 6 % — LOW (ref 14–50)
MAGNESIUM SERPL-MCNC: 2.1 MG/DL — SIGNIFICANT CHANGE UP (ref 1.6–2.6)
MCHC RBC-ENTMCNC: 28.4 PG — SIGNIFICANT CHANGE UP (ref 27–34)
MCHC RBC-ENTMCNC: 32.5 G/DL — SIGNIFICANT CHANGE UP (ref 32–36)
MCV RBC AUTO: 87.5 FL — SIGNIFICANT CHANGE UP (ref 80–100)
PLATELET # BLD AUTO: 460 K/UL — HIGH (ref 150–400)
POTASSIUM SERPL-MCNC: 4.1 MMOL/L — SIGNIFICANT CHANGE UP (ref 3.5–5.3)
POTASSIUM SERPL-SCNC: 4.1 MMOL/L — SIGNIFICANT CHANGE UP (ref 3.5–5.3)
RBC # BLD: 3.52 M/UL — LOW (ref 3.8–5.2)
RBC # FLD: 14.7 % — SIGNIFICANT CHANGE UP (ref 10.3–16.9)
SODIUM SERPL-SCNC: 136 MMOL/L — SIGNIFICANT CHANGE UP (ref 135–145)
TIBC SERPL-MCNC: 251 UG/DL — SIGNIFICANT CHANGE UP (ref 220–430)
TRANSFERRIN SERPL-MCNC: 213 MG/DL — SIGNIFICANT CHANGE UP (ref 200–360)
UIBC SERPL-MCNC: 237 UG/DL — SIGNIFICANT CHANGE UP (ref 110–370)
WBC # BLD: 9.3 K/UL — SIGNIFICANT CHANGE UP (ref 3.8–10.5)
WBC # FLD AUTO: 9.3 K/UL — SIGNIFICANT CHANGE UP (ref 3.8–10.5)

## 2018-04-14 RX ORDER — HALOPERIDOL DECANOATE 100 MG/ML
0.5 INJECTION INTRAMUSCULAR ONCE
Qty: 0 | Refills: 0 | Status: COMPLETED | OUTPATIENT
Start: 2018-04-14 | End: 2018-04-14

## 2018-04-14 RX ADMIN — HALOPERIDOL DECANOATE 0.5 MILLIGRAM(S): 100 INJECTION INTRAMUSCULAR at 06:35

## 2018-04-14 RX ADMIN — PANTOPRAZOLE SODIUM 40 MILLIGRAM(S): 20 TABLET, DELAYED RELEASE ORAL at 07:09

## 2018-04-14 NOTE — PROGRESS NOTE ADULT - SUBJECTIVE AND OBJECTIVE BOX
Pt seen and examined  coverage Dr. Quezada  confused    REVIEW OF SYSTEMS:  unable to give      MEDICATIONS:  MEDICATIONS  (STANDING):  pantoprazole    Tablet 40 milliGRAM(s) Oral before breakfast    MEDICATIONS  (PRN):  acetaminophen   Tablet. 325 milliGRAM(s) Oral every 6 hours PRN Mild Pain (1 - 3)      Allergies    penicillin (Unknown)    Intolerances        Vital Signs Last 24 Hrs  T(C): 37.1 (14 Apr 2018 06:01), Max: 37.8 (13 Apr 2018 22:23)  T(F): 98.8 (14 Apr 2018 06:01), Max: 100 (13 Apr 2018 22:23)  HR: 75 (14 Apr 2018 06:01) (70 - 75)  BP: 103/60 (14 Apr 2018 06:01) (103/60 - 121/91)  BP(mean): --  RR: 18 (14 Apr 2018 06:01) (17 - 18)  SpO2: 99% (14 Apr 2018 06:01) (99% - 100%)    04-13 @ 07:01  -  04-14 @ 07:00  --------------------------------------------------------  IN: 0 mL / OUT: 1 mL / NET: -1 mL    04-14 @ 07:01  -  04-14 @ 13:14  --------------------------------------------------------  IN: 0 mL / OUT: 1 mL / NET: -1 mL        PHYSICAL EXAM:    General: elderly, in no acute distress  HEENT: MMM, conjunctiva and sclera clear  Gastrointestinal: Soft non-tender non-distended; Normal bowel sounds; No hepatosplenomegaly  Skin: Warm and dry. No obvious rash    LABS:      CBC Full  -  ( 14 Apr 2018 11:15 )  WBC Count : 9.3 K/uL  Hemoglobin : 10.0 g/dL  Hematocrit : 30.8 %  Platelet Count - Automated : 460 K/uL  Mean Cell Volume : 87.5 fL  Mean Cell Hemoglobin : 28.4 pg  Mean Cell Hemoglobin Concentration : 32.5 g/dL  Auto Neutrophil # : x  Auto Lymphocyte # : x  Auto Monocyte # : x  Auto Eosinophil # : x  Auto Basophil # : x  Auto Neutrophil % : x  Auto Lymphocyte % : x  Auto Monocyte % : x  Auto Eosinophil % : x  Auto Basophil % : x    04-14    136  |  100  |  19  ----------------------------<  105<H>  4.1   |  23  |  0.79    Ca    8.8      14 Apr 2018 11:15  Mg     2.1     04-14    TPro  6.6  /  Alb  3.6  /  TBili  <0.2  /  DBili  x   /  AST  27  /  ALT  15  /  AlkPhos  66  04-12    PT/INR - ( 12 Apr 2018 16:19 )   PT: 10.8 sec;   INR: 0.97          PTT - ( 12 Apr 2018 16:19 )  PTT:28.5 sec                  RADIOLOGY & ADDITIONAL STUDIES (The following images were personally reviewed):

## 2018-04-14 NOTE — CHART NOTE - NSCHARTNOTEFT_GEN_A_CORE
PGY-1 EVENT NOTE    Pt noted to be getting out of bed and agitated overnight, able to be re-directed multiple times and kept calm by 1:1 observation. However, called by nurse manager around 6:15AM that patient is agitated, getting out of bed and running into the [place], fighting and combative with staff. Immediately went to bedside and attempted to re-direct patient, who was agitated, combative, trying to bite and scratch the PCA and myself and swinging her cane in the air. She was also trying to jump out of bed, swinging her legs over the side of the bed rail and kicking at staff members attempting to get patient back in bed to prevent fall. Pt fixated on calling the police with paranoia about staff. Unable to redirect and in order to prevent patient from jumping out of bed, pt was given 0.5 IM Haldol x1. S/p Haldol, patient became slightly more calm however and we were able to get her back into bed, however still with paranoia about staff and agitated. 1:1 continued. Will continue to re-direct and reassess need for further medication for safety of patient.

## 2018-04-15 LAB
ANION GAP SERPL CALC-SCNC: 14 MMOL/L — SIGNIFICANT CHANGE UP (ref 5–17)
BLD GP AB SCN SERPL QL: NEGATIVE — SIGNIFICANT CHANGE UP
BUN SERPL-MCNC: 18 MG/DL — SIGNIFICANT CHANGE UP (ref 7–23)
CALCIUM SERPL-MCNC: 8.7 MG/DL — SIGNIFICANT CHANGE UP (ref 8.4–10.5)
CHLORIDE SERPL-SCNC: 103 MMOL/L — SIGNIFICANT CHANGE UP (ref 96–108)
CO2 SERPL-SCNC: 22 MMOL/L — SIGNIFICANT CHANGE UP (ref 22–31)
CREAT SERPL-MCNC: 0.81 MG/DL — SIGNIFICANT CHANGE UP (ref 0.5–1.3)
GLUCOSE SERPL-MCNC: 99 MG/DL — SIGNIFICANT CHANGE UP (ref 70–99)
HCT VFR BLD CALC: 29.4 % — LOW (ref 34.5–45)
HGB BLD-MCNC: 9.8 G/DL — LOW (ref 11.5–15.5)
MAGNESIUM SERPL-MCNC: 2 MG/DL — SIGNIFICANT CHANGE UP (ref 1.6–2.6)
MCHC RBC-ENTMCNC: 28.8 PG — SIGNIFICANT CHANGE UP (ref 27–34)
MCHC RBC-ENTMCNC: 33.3 G/DL — SIGNIFICANT CHANGE UP (ref 32–36)
MCV RBC AUTO: 86.5 FL — SIGNIFICANT CHANGE UP (ref 80–100)
PLATELET # BLD AUTO: 444 K/UL — HIGH (ref 150–400)
POTASSIUM SERPL-MCNC: 3.9 MMOL/L — SIGNIFICANT CHANGE UP (ref 3.5–5.3)
POTASSIUM SERPL-SCNC: 3.9 MMOL/L — SIGNIFICANT CHANGE UP (ref 3.5–5.3)
RBC # BLD: 3.4 M/UL — LOW (ref 3.8–5.2)
RBC # FLD: 14.6 % — SIGNIFICANT CHANGE UP (ref 10.3–16.9)
RH IG SCN BLD-IMP: POSITIVE — SIGNIFICANT CHANGE UP
SODIUM SERPL-SCNC: 139 MMOL/L — SIGNIFICANT CHANGE UP (ref 135–145)
WBC # BLD: 8.2 K/UL — SIGNIFICANT CHANGE UP (ref 3.8–10.5)
WBC # FLD AUTO: 8.2 K/UL — SIGNIFICANT CHANGE UP (ref 3.8–10.5)

## 2018-04-15 RX ORDER — POTASSIUM CHLORIDE 20 MEQ
20 PACKET (EA) ORAL ONCE
Qty: 0 | Refills: 0 | Status: DISCONTINUED | OUTPATIENT
Start: 2018-04-15 | End: 2018-04-15

## 2018-04-15 RX ORDER — POTASSIUM CHLORIDE 20 MEQ
40 PACKET (EA) ORAL ONCE
Qty: 0 | Refills: 0 | Status: COMPLETED | OUTPATIENT
Start: 2018-04-15 | End: 2018-04-15

## 2018-04-15 RX ADMIN — Medication 4000 MILLILITER(S): at 12:10

## 2018-04-15 RX ADMIN — Medication 40 MILLIEQUIVALENT(S): at 11:24

## 2018-04-15 RX ADMIN — PANTOPRAZOLE SODIUM 40 MILLIGRAM(S): 20 TABLET, DELAYED RELEASE ORAL at 06:28

## 2018-04-15 NOTE — PROGRESS NOTE ADULT - SUBJECTIVE AND OBJECTIVE BOX
INTERVAL HPI/OVERNIGHT EVENTS:  Agitated overnight, placed on enhanced supervision.    SUBJECTIVE: Patient seen and examined at bedside. Cooperative, communicating appropriately. Reports pain in R leg and lower back. Otherwise denies CP, SOB, abdominal pain.    ROS:  CV: Denies chest pain  Resp: Denies SOB  GI: Denies abdominal pain, constipation, diarrhea, nausea, vomiting  : Denies dysuria  ID: Denies fevers, chills  MSK: + joint pain     OBJECTIVE:    VITAL SIGNS:  ICU Vital Signs Last 24 Hrs  T(C): 36.6 (15 Apr 2018 06:22), Max: 37.2 (14 Apr 2018 22:27)  T(F): 97.8 (15 Apr 2018 06:22), Max: 99 (14 Apr 2018 22:27)  HR: 67 (15 Apr 2018 06:22) (67 - 87)  BP: 161/75 (15 Apr 2018 06:22) (122/84 - 164/78)  BP(mean): --  ABP: --  ABP(mean): --  RR: 18 (15 Apr 2018 06:22) (18 - 20)  SpO2: 99% (15 Apr 2018 06:22) (98% - 100%)        04-14 @ 07:01  -  04-15 @ 07:00  --------------------------------------------------------  IN: 50 mL / OUT: 2 mL / NET: 48 mL      CAPILLARY BLOOD GLUCOSE          PHYSICAL EXAM:  Constitutional: WDWN resting comfortably in bed; NAD  	Head: healing bruise/erosion over L temporal region near lateral eyebrow  	Eyes: PERRL, EOMI, anicteric sclera  	ENT: no nasal discharge; uvula midline, no oropharyngeal erythema or exudates; MMM, no sublingual icterus  	Neck: supple; no JVD or thyromegaly  	Respiratory: mild bibasilar crackles; no W/R/R, no retractions  	Cardiac: +S1/S2; RRR; no M/R/G; PMI non-displaced  	Gastrointestinal: abdomen soft, tender in lower quadrants/ND; no rebound or guarding; +BSx4  	Back: tender to palpation of lower back  	Extremities: WWP, no clubbing or cyanosis; no peripheral edema  	Vascular: 2+ radial, femoral, DP/PT pulses B/L  Dermatologic: skin warm, dry and intact; no rashes, wounds, or scars    MEDICATIONS:  MEDICATIONS  (STANDING):  pantoprazole    Tablet 40 milliGRAM(s) Oral before breakfast  polyethylene glycol/electrolyte Solution. 4000 milliLiter(s) Oral once    MEDICATIONS  (PRN):  acetaminophen   Tablet. 325 milliGRAM(s) Oral every 6 hours PRN Mild Pain (1 - 3)      ALLERGIES:  Allergies    penicillin (Unknown)    Intolerances        LABS:                        9.8    8.2   )-----------( 444      ( 15 Apr 2018 06:17 )             29.4     04-15    139  |  103  |  18  ----------------------------<  99  3.9   |  22  |  0.81    Ca    8.7      15 Apr 2018 06:16  Mg     2.0     04-15            RADIOLOGY & ADDITIONAL TESTS: Reviewed.

## 2018-04-15 NOTE — PROGRESS NOTE ADULT - PROBLEM SELECTOR PLAN 5
-clear liquid diet; NPO after 12AM Monday  -Replete lytes PRN  -No IVFs  -no HSQ in setting of bleed    Dispo: [facility]

## 2018-04-15 NOTE — PROGRESS NOTE ADULT - SUBJECTIVE AND OBJECTIVE BOX
Coverage for Dr. Quezada   Pt seen and examined c/o epigastric discomfort and back pain    REVIEW OF SYSTEMS:  Constitutional: No fever, weight loss or fatigue  Cardiovascular: No chest pain, palpitations, dizziness or leg swelling  Gastrointestinal: mild abdominal  pain. No nausea, vomiting or hematemesis; No diarrhea or constipation. No melena or hematochezia.  Skin: No itching, burning, rashes or lesions       MEDICATIONS:  MEDICATIONS  (STANDING):  pantoprazole    Tablet 40 milliGRAM(s) Oral before breakfast  polyethylene glycol/electrolyte Solution. 4000 milliLiter(s) Oral once    MEDICATIONS  (PRN):  acetaminophen   Tablet. 325 milliGRAM(s) Oral every 6 hours PRN Mild Pain (1 - 3)      Allergies    penicillin (Unknown)    Intolerances        Vital Signs Last 24 Hrs  T(C): 36.4 (15 Apr 2018 08:36), Max: 37.2 (14 Apr 2018 22:27)  T(F): 97.5 (15 Apr 2018 08:36), Max: 99 (14 Apr 2018 22:27)  HR: 69 (15 Apr 2018 08:36) (67 - 87)  BP: 154/88 (15 Apr 2018 08:36) (132/69 - 164/78)  BP(mean): --  RR: 16 (15 Apr 2018 08:36) (16 - 20)  SpO2: 97% (15 Apr 2018 08:36) (97% - 100%)    04-14 @ 07:01  -  04-15 @ 07:00  --------------------------------------------------------  IN: 50 mL / OUT: 2 mL / NET: 48 mL        PHYSICAL EXAM:    General: ; in no acute distress  HEENT: MMM, conjunctiva and sclera clear  Gastrointestinal: Soft non-tender scaphoid; Normal bowel sounds; No hepatosplenomegaly  Skin: Warm and dry. No obvious rash    LABS:      CBC Full  -  ( 15 Apr 2018 06:17 )  WBC Count : 8.2 K/uL  Hemoglobin : 9.8 g/dL  Hematocrit : 29.4 %  Platelet Count - Automated : 444 K/uL  Mean Cell Volume : 86.5 fL  Mean Cell Hemoglobin : 28.8 pg  Mean Cell Hemoglobin Concentration : 33.3 g/dL  Auto Neutrophil # : x  Auto Lymphocyte # : x  Auto Monocyte # : x  Auto Eosinophil # : x  Auto Basophil # : x  Auto Neutrophil % : x  Auto Lymphocyte % : x  Auto Monocyte % : x  Auto Eosinophil % : x  Auto Basophil % : x    04-15    139  |  103  |  18  ----------------------------<  99  3.9   |  22  |  0.81    Ca    8.7      15 Apr 2018 06:16  Mg     2.0     04-15                        RADIOLOGY & ADDITIONAL STUDIES (The following images were personally reviewed):

## 2018-04-15 NOTE — PROGRESS NOTE ADULT - PROBLEM SELECTOR PLAN 1
-Hb 7.1 on admission in setting of positive FOBT; evaluated by Dr. Quezada in ED who scheduled patient for colonoscopy on Monday morning at 8:30 AM  -transitioned to clear liquids today and Golytely prep  -protonix 40 PO daily per Dr. Quezada  -s/p 2u RBCs, transfusion goal >8.5  -f/u post-transfusion CBC  -NPO after midnight today  -f/u iron studies

## 2018-04-15 NOTE — PROGRESS NOTE ADULT - ASSESSMENT
89F PMH spinal stenosis, RA, HTN, Alzheimer's presented to [facility] from outpatient clinic with low hemoglobin, found to have Hb 7.1 with positive FOBT, scheduled for colonoscopy on Monday.

## 2018-04-16 LAB
ANION GAP SERPL CALC-SCNC: 12 MMOL/L — SIGNIFICANT CHANGE UP (ref 5–17)
BUN SERPL-MCNC: 12 MG/DL — SIGNIFICANT CHANGE UP (ref 7–23)
CALCIUM SERPL-MCNC: 9.1 MG/DL — SIGNIFICANT CHANGE UP (ref 8.4–10.5)
CHLORIDE SERPL-SCNC: 101 MMOL/L — SIGNIFICANT CHANGE UP (ref 96–108)
CO2 SERPL-SCNC: 26 MMOL/L — SIGNIFICANT CHANGE UP (ref 22–31)
CREAT SERPL-MCNC: 0.78 MG/DL — SIGNIFICANT CHANGE UP (ref 0.5–1.3)
GLUCOSE SERPL-MCNC: 109 MG/DL — HIGH (ref 70–99)
HCT VFR BLD CALC: 32.6 % — LOW (ref 34.5–45)
HGB BLD-MCNC: 10.7 G/DL — LOW (ref 11.5–15.5)
MAGNESIUM SERPL-MCNC: 2 MG/DL — SIGNIFICANT CHANGE UP (ref 1.6–2.6)
MCHC RBC-ENTMCNC: 28.8 PG — SIGNIFICANT CHANGE UP (ref 27–34)
MCHC RBC-ENTMCNC: 32.8 G/DL — SIGNIFICANT CHANGE UP (ref 32–36)
MCV RBC AUTO: 87.6 FL — SIGNIFICANT CHANGE UP (ref 80–100)
PLATELET # BLD AUTO: 467 K/UL — HIGH (ref 150–400)
POTASSIUM SERPL-MCNC: 4.2 MMOL/L — SIGNIFICANT CHANGE UP (ref 3.5–5.3)
POTASSIUM SERPL-SCNC: 4.2 MMOL/L — SIGNIFICANT CHANGE UP (ref 3.5–5.3)
RBC # BLD: 3.72 M/UL — LOW (ref 3.8–5.2)
RBC # FLD: 14.9 % — SIGNIFICANT CHANGE UP (ref 10.3–16.9)
SODIUM SERPL-SCNC: 139 MMOL/L — SIGNIFICANT CHANGE UP (ref 135–145)
WBC # BLD: 9 K/UL — SIGNIFICANT CHANGE UP (ref 3.8–10.5)
WBC # FLD AUTO: 9 K/UL — SIGNIFICANT CHANGE UP (ref 3.8–10.5)

## 2018-04-16 PROCEDURE — 93010 ELECTROCARDIOGRAM REPORT: CPT

## 2018-04-16 RX ORDER — QUETIAPINE FUMARATE 200 MG/1
12.5 TABLET, FILM COATED ORAL AT BEDTIME
Qty: 0 | Refills: 0 | Status: DISCONTINUED | OUTPATIENT
Start: 2018-04-16 | End: 2018-04-16

## 2018-04-16 RX ORDER — QUETIAPINE FUMARATE 200 MG/1
12.5 TABLET, FILM COATED ORAL AT BEDTIME
Qty: 0 | Refills: 0 | Status: DISCONTINUED | OUTPATIENT
Start: 2018-04-17 | End: 2018-04-17

## 2018-04-16 RX ORDER — HALOPERIDOL DECANOATE 100 MG/ML
0.25 INJECTION INTRAMUSCULAR ONCE
Qty: 0 | Refills: 0 | Status: COMPLETED | OUTPATIENT
Start: 2018-04-16 | End: 2018-04-16

## 2018-04-16 RX ORDER — SOD SULF/SODIUM/NAHCO3/KCL/PEG
4000 SOLUTION, RECONSTITUTED, ORAL ORAL ONCE
Qty: 0 | Refills: 0 | Status: COMPLETED | OUTPATIENT
Start: 2018-04-16 | End: 2018-04-16

## 2018-04-16 RX ADMIN — Medication 4000 MILLILITER(S): at 19:58

## 2018-04-16 RX ADMIN — QUETIAPINE FUMARATE 12.5 MILLIGRAM(S): 200 TABLET, FILM COATED ORAL at 22:29

## 2018-04-16 RX ADMIN — HALOPERIDOL DECANOATE 0.25 MILLIGRAM(S): 100 INJECTION INTRAMUSCULAR at 04:49

## 2018-04-16 NOTE — CHART NOTE - NSCHARTNOTEFT_GEN_A_CORE
Upon Nutritional Assessment by the Registered Dietitian your patient was determined to meet criteria / has evidence of the following diagnosis/diagnoses:          [ ]  Mild Protein Calorie Malnutrition        [X ]  Moderate Protein Calorie Malnutrition        [ ] Severe Protein Calorie Malnutrition        [ ] Unspecified Protein Calorie Malnutrition        [ ] Underweight / BMI <19        [ ] Morbid Obesity / BMI > 40      Findings as based on:  •  Comprehensive nutrition assessment and consultation        Treatment:  Please refer to dietitian initial assessment and flow sheet notes for recommendations   The following diet has been recommended: Low Na and Ensure Enlive x1/d      PROVIDER Section:     By signing this assessment you are acknowledging and agree with the diagnosis/diagnoses assigned by the Registered Dietitian    Comments:

## 2018-04-16 NOTE — DIETITIAN INITIAL EVALUATION ADULT. - NS AS NUTRI INTERV MEALS SNACK
General/healthful diet/Please encourage po intake w/ meals when diet advanced; assist w/ feeding prn

## 2018-04-16 NOTE — PROGRESS NOTE ADULT - SUBJECTIVE AND OBJECTIVE BOX
INTERVAL HPI/OVERNIGHT EVENTS:  Patient agitated overnight, even with sitter.     SUBJECTIVE: Patient seen and examined at bedside. Reports some abdominal pain, continued back pain and leg pain. Denies SOB, chest pain.     ROS:  CV: Denies chest pain  Resp: Denies SOB  GI: +abdominal pain, denies constipation, diarrhea, nausea, vomiting  : Denies dysuria  ID: Denies fevers, chills  MSK: Denies joint pain     OBJECTIVE:    VITAL SIGNS:  ICU Vital Signs Last 24 Hrs  T(C): 37.3 (16 Apr 2018 05:01), Max: 37.3 (16 Apr 2018 05:01)  T(F): 99.1 (16 Apr 2018 05:01), Max: 99.1 (16 Apr 2018 05:01)  HR: 82 (16 Apr 2018 07:20) (63 - 115)  BP: 156/89 (16 Apr 2018 05:01) (118/77 - 159/61)  BP(mean): --  ABP: --  ABP(mean): --  RR: 16 (16 Apr 2018 05:01) (14 - 18)  SpO2: 98% (16 Apr 2018 05:01) (98% - 100%)        CAPILLARY BLOOD GLUCOSE          PHYSICAL EXAM:  Constitutional: WDWN resting comfortably in bed; NAD  	Head: healing bruise/erosion over L temporal region near lateral eyebrow  	Eyes: PERRL, EOMI, anicteric sclera  	ENT: no nasal discharge; uvula midline, no oropharyngeal erythema or exudates; MMM, no sublingual icterus  	Neck: supple; no JVD or thyromegaly  	Respiratory: mild bibasilar crackles; no W/R/R, no retractions  	Cardiac: +S1/S2; RRR; no M/R/G; PMI non-displaced  	Gastrointestinal: abdomen soft, tender in lower quadrants/ND; no rebound or guarding; +BSx4  	Back: tender to palpation of lower back  	Extremities: WWP, no clubbing or cyanosis; no peripheral edema  	Vascular: 2+ radial, femoral, DP/PT pulses B/L  Dermatologic: skin warm, dry and intact; no rashes, wounds, or scars    MEDICATIONS:  MEDICATIONS  (STANDING):  pantoprazole    Tablet 40 milliGRAM(s) Oral before breakfast  polyethylene glycol/electrolyte Solution. 4000 milliLiter(s) Oral once    MEDICATIONS  (PRN):  acetaminophen   Tablet. 325 milliGRAM(s) Oral every 6 hours PRN Mild Pain (1 - 3)      ALLERGIES:  Allergies    penicillin (Unknown)    Intolerances        LABS:                        10.7   9.0   )-----------( 467      ( 16 Apr 2018 12:46 )             32.6     04-16    139  |  101  |  12  ----------------------------<  109<H>  4.2   |  26  |  0.78    Ca    9.1      16 Apr 2018 12:46  Mg     2.0     04-16            RADIOLOGY & ADDITIONAL TESTS: Reviewed.

## 2018-04-16 NOTE — DIETITIAN INITIAL EVALUATION ADULT. - OTHER INFO
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 Pt was npo today, unable to assess po intake, however shereports that her appetite has declined lately, (~2 weeks) d/t pain and overall not feeling well; pt does not remember her UBW, but feels likes she lost wt unintentiollay, per physical exam pt was foung w/ muscle and fat loss --> depression around orbital and temple region, prominent clavicle bone and little folds between folds at triceps and biceps. Pt denies any gi distress and agrees to 1x Ensure Enlive /d w/ diet advancement- d/w MD; skin--ecchymotic, w/ no edema.

## 2018-04-16 NOTE — PROGRESS NOTE ADULT - SUBJECTIVE AND OBJECTIVE BOX
GERONIMO MACK    HPI:  89F PMH spinal stenosis, RA, HTN, Alzheimer's presented to [facility] from outpatient clinic with low hemoglobin. History primarily obtained from patient's son in law and patient. Patient was at routine check up earlier today, found to have low hemoglobin with no signs of bleeding noted at home. She has been weak for the past day, however no blood noted in the stool, no darkened stools, no hematemesis/hemoptysis. Denies symptoms of dizziness, headache, SOB, chest pain, abdominal pain, N/V, fevers chills. Has not taken NSAIDs, not on any blood thinners at home. Takes tylenol for arthritic pain and spinal stenosis pain, which is currently not well controlled as she is laying in bed uncomfortably. Has never been transfused blood, never had blood in the stool. Decreased PO intake recently due to lack of sleep/ decreased taste sensation. Patient fell at home 8 days ago, has bruise L forehead. Family has not noticed any neurological deficits or change in mental status. On assessment by GI in the ED, FOBT was positive, and patient admitted for colonoscopy on  Monday.     In the ED, VS were T 97, HR 76, /50, RR 18, O2 99% on RA. Labs significant for Hb 7.1, WBC 11.3, 86% neutrophils, normal coag profile, BUN 40.  Given 650mg tylenol, 1L NS bolus, ordered for 1unit RBCs. (12 Apr 2018 18:31)    no acute events  h/h stable    Allergies    penicillin (Unknown)    Intolerances        acetaminophen   Tablet. 325 milliGRAM(s) Oral every 6 hours PRN  pantoprazole    Tablet 40 milliGRAM(s) Oral before breakfast  polyethylene glycol/electrolyte Solution. 4000 milliLiter(s) Oral once      Vital Signs Last 24 Hrs  T(C): 37.3 (16 Apr 2018 05:01), Max: 37.3 (16 Apr 2018 05:01)  T(F): 99.1 (16 Apr 2018 05:01), Max: 99.1 (16 Apr 2018 05:01)  HR: 82 (16 Apr 2018 07:20) (63 - 115)  BP: 156/89 (16 Apr 2018 05:01) (118/77 - 159/61)  BP(mean): --  RR: 16 (16 Apr 2018 05:01) (14 - 18)  SpO2: 98% (16 Apr 2018 05:01) (98% - 100%)    PHYSICAL EXAM:      Constitutional:nad    Respiratory:cta    Cardiovascular:[id number]    Gastrointestinal:soft nt bs                            9.8    8.2   )-----------( 444      ( 15 Apr 2018 06:17 )             29.4       04-15    139  |  103  |  18  ----------------------------<  99  3.9   |  22  |  0.81    Ca    8.7      15 Apr 2018 06:16  Mg     2.0     04-15        A/P    egd -see report  colon-need to repeat due to poor prep

## 2018-04-16 NOTE — DIETITIAN INITIAL EVALUATION ADULT. - PROBLEM SELECTOR PLAN 1
-Hb 7.1 in setting of positive FOBT; evaluated by Dr. Quezada in ED who scheduled patient for colonoscopy on Monday morning at 8:30 AM  -full liquid diet until Sunday, then transition to clear liquids and Golytely prep  -protonix 40 PO daily per Dr. Quezada  -ordered for 1unit PRBCs, transfusion goal >8  -f/u post-transfusion CBC  -NPO after midnight Sunday

## 2018-04-16 NOTE — DIETITIAN INITIAL EVALUATION ADULT. - ENERGY NEEDS
Ht: 152.4cm, wt (4/12) 45.4kg, IBW: 45.5kg   %IBW: 100%  BMI:  19.5; ABW utilized for nutritional needs as it is within % of IBW; needs adjusted per -replenishment per age d/t suspected malnutrition

## 2018-04-16 NOTE — PROGRESS NOTE ADULT - ASSESSMENT
89F PMH spinal stenosis, RA, HTN, Alzheimer's presented to [facility] from outpatient clinic with low hemoglobin, found to have Hb 7.1 with positive FOBT, scheduled for colonoscopy on Monday.

## 2018-04-16 NOTE — PROGRESS NOTE ADULT - PROBLEM SELECTOR PLAN 1
-Hb 7.1 on admission in setting of positive FOBT; evaluated by Dr. Quezada in ED who scheduled patient for colonoscopy on Monday however due to inadequate prep will be done Tuesday  -transitioned to full liquids today and Golytely prep tonight. NPO at 12AM  -protonix 40 PO daily per Dr. 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

## 2018-04-17 LAB
ANION GAP SERPL CALC-SCNC: 15 MMOL/L — SIGNIFICANT CHANGE UP (ref 5–17)
BUN SERPL-MCNC: 13 MG/DL — SIGNIFICANT CHANGE UP (ref 7–23)
CALCIUM SERPL-MCNC: 9.1 MG/DL — SIGNIFICANT CHANGE UP (ref 8.4–10.5)
CHLORIDE SERPL-SCNC: 102 MMOL/L — SIGNIFICANT CHANGE UP (ref 96–108)
CO2 SERPL-SCNC: 22 MMOL/L — SIGNIFICANT CHANGE UP (ref 22–31)
CREAT SERPL-MCNC: 0.84 MG/DL — SIGNIFICANT CHANGE UP (ref 0.5–1.3)
GLUCOSE SERPL-MCNC: 102 MG/DL — HIGH (ref 70–99)
HCT VFR BLD CALC: 35 % — SIGNIFICANT CHANGE UP (ref 34.5–45)
HGB BLD-MCNC: 11 G/DL — LOW (ref 11.5–15.5)
MAGNESIUM SERPL-MCNC: 2.2 MG/DL — SIGNIFICANT CHANGE UP (ref 1.6–2.6)
MCHC RBC-ENTMCNC: 28.1 PG — SIGNIFICANT CHANGE UP (ref 27–34)
MCHC RBC-ENTMCNC: 31.4 G/DL — LOW (ref 32–36)
MCV RBC AUTO: 89.5 FL — SIGNIFICANT CHANGE UP (ref 80–100)
PLATELET # BLD AUTO: 511 K/UL — HIGH (ref 150–400)
POTASSIUM SERPL-MCNC: 4.3 MMOL/L — SIGNIFICANT CHANGE UP (ref 3.5–5.3)
POTASSIUM SERPL-SCNC: 4.3 MMOL/L — SIGNIFICANT CHANGE UP (ref 3.5–5.3)
RBC # BLD: 3.91 M/UL — SIGNIFICANT CHANGE UP (ref 3.8–5.2)
RBC # FLD: 14.7 % — SIGNIFICANT CHANGE UP (ref 10.3–16.9)
SODIUM SERPL-SCNC: 139 MMOL/L — SIGNIFICANT CHANGE UP (ref 135–145)
SURGICAL PATHOLOGY STUDY: SIGNIFICANT CHANGE UP
WBC # BLD: 13.6 K/UL — HIGH (ref 3.8–10.5)
WBC # FLD AUTO: 13.6 K/UL — HIGH (ref 3.8–10.5)

## 2018-04-17 PROCEDURE — 70450 CT HEAD/BRAIN W/O DYE: CPT | Mod: 26

## 2018-04-17 PROCEDURE — 71045 X-RAY EXAM CHEST 1 VIEW: CPT | Mod: 26

## 2018-04-17 PROCEDURE — 73030 X-RAY EXAM OF SHOULDER: CPT | Mod: 26,LT

## 2018-04-17 RX ORDER — SODIUM CHLORIDE 9 MG/ML
1000 INJECTION INTRAMUSCULAR; INTRAVENOUS; SUBCUTANEOUS
Qty: 0 | Refills: 0 | Status: DISCONTINUED | OUTPATIENT
Start: 2018-04-17 | End: 2018-04-17

## 2018-04-17 RX ADMIN — PANTOPRAZOLE SODIUM 40 MILLIGRAM(S): 20 TABLET, DELAYED RELEASE ORAL at 06:24

## 2018-04-17 NOTE — DISCHARGE NOTE ADULT - INSTRUCTIONS
Please eat a high fiber diet. See your doctors. Report any dizziness, weakness, chestb pain or shortness of breath,fever, or any unusual symptoms.

## 2018-04-17 NOTE — PROGRESS NOTE ADULT - PROBLEM SELECTOR PLAN 1
-Hb 7.1 on admission in setting of positive FOBT; evaluated by Dr. Quezada in ED who scheduled patient for colonoscopy on Monday however due to inadequate prep will be done Wednesday  -transitioned to full liquids today and Golytely prep tonight. NPO at 12AM  -protonix 40 PO daily per Dr. 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 -Hb 7.1 on admission in setting of positive FOBT; evaluated by Dr. Quezada in ED who scheduled patient for colonoscopy on Monday however due to inadequate prep will be done Wednesday  -[facility] prep tonight. NPO at 12AM  -protonix 40 PO daily per Dr. 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

## 2018-04-17 NOTE — DISCHARGE NOTE ADULT - PLAN OF CARE
Resolution of bleeding with outpatient GI follow up You were admitted to the hospital with anemia (low red blood cell counts), for which you got 2 blood transfusions. You were evaluated by gastroenterology (Dr. Quezada) and found to have a mass in part of your stomach. This mass was sent for biopsy, and you should follow up with Dr. Quezada and/or Dr. Sow for results. If you experience any further bleeding, chest pain, palpitations, shortness of breath, or dizziness upon standing, please go to the emergency room. Please continue only with Tylenol for pain control. Avoid ALL NSAID medications (Celebrex, ibuprofen, Advil, Aleve, aspirin), as this can cause gastrointestinal bleeding. While you were in the hospital, we held your losartan due to bleeding. It ____is/is not_____ safe to resume this medication. Please follow up with Dr. Sow for further management of your high blood pressure. While you were in the hospital, we held your losartan due to bleeding. It is safe to resume this medication. Please follow up with Dr. Sow for further management of your high blood pressure.

## 2018-04-17 NOTE — DISCHARGE NOTE ADULT - MEDICATION SUMMARY - MEDICATIONS TO TAKE
I will START or STAY ON the medications listed below when I get home from the hospital:    Tylenol  -- 325  by mouth 6 times a day, As Needed  -- Indication: For Pain    losartan 25 mg oral tablet  -- 1 tab(s) by mouth once a day  -- Indication: For HTN    DULoxetine  -- 60 milligram(s) by mouth once a day  -- Indication: For Depression    bisacodyl 5 mg oral delayed release tablet  -- 1 tab(s) by mouth once a day (at bedtime)  -- Indication: For Constipation

## 2018-04-17 NOTE — PROGRESS NOTE ADULT - SUBJECTIVE AND OBJECTIVE BOX
INTERVAL HPI/OVERNIGHT EVENTS:    SUBJECTIVE: Patient seen and examined at bedside.     ROS:  CV: Denies chest pain  Resp: Denies SOB  GI: Denies abdominal pain, constipation, diarrhea, nausea, vomiting  : Denies dysuria  ID: Denies fevers, chills  MSK: Denies joint pain     OBJECTIVE:    VITAL SIGNS:  ICU Vital Signs Last 24 Hrs  T(C): 37.7 (17 Apr 2018 10:08), Max: 37.7 (17 Apr 2018 10:08)  T(F): 99.9 (17 Apr 2018 10:08), Max: 99.9 (17 Apr 2018 10:08)  HR: 97 (17 Apr 2018 09:52) (71 - 97)  BP: 144/80 (17 Apr 2018 09:52) (144/80 - 179/73)  BP(mean): --  ABP: --  ABP(mean): --  RR: 18 (17 Apr 2018 09:52) (16 - 18)  SpO2: 96% (17 Apr 2018 09:52) (96% - 98%)        04-16 @ 07:01  -  04-17 @ 07:00  --------------------------------------------------------  IN: 0 mL / OUT: 1 mL / NET: -1 mL      CAPILLARY BLOOD GLUCOSE          PHYSICAL EXAM:  Constitutional: Sleeping in bed  	Head: healing bruise/erosion over L temporal region near lateral eyebrow  	Eyes: PERRL, EOMI, anicteric sclera  	ENT: no nasal discharge; uvula midline, no oropharyngeal erythema or exudates; MMM, no sublingual icterus  	Neck: supple; no JVD or thyromegaly  	Respiratory: mild bibasilar crackles; no W/R/R, no retractions  	Cardiac: +S1/S2; RRR; no M/R/G; PMI non-displaced  	Gastrointestinal: abdomen soft, NT/ND; no rebound or guarding; +BSx4  	Back: tender to palpation of lower back  	Extremities: WWP, no clubbing or cyanosis; no peripheral edema  	Vascular: 2+ radial, femoral, DP/PT pulses B/L  Neuro: somnolent, arousable to painful stimuli, awakens to name call, moving extremities    MEDICATIONS:  MEDICATIONS  (STANDING):  pantoprazole    Tablet 40 milliGRAM(s) Oral before breakfast  sodium chloride 0.9%. 1000 milliLiter(s) (100 mL/Hr) IV Continuous <Continuous>    MEDICATIONS  (PRN):  acetaminophen   Tablet. 325 milliGRAM(s) Oral every 6 hours PRN Mild Pain (1 - 3)      ALLERGIES:  Allergies    penicillin (Unknown)    Intolerances        LABS:                        11.0   13.6  )-----------( 511      ( 17 Apr 2018 07:21 )             35.0     04-17    139  |  102  |  13  ----------------------------<  102<H>  4.3   |  22  |  0.84    Ca    9.1      17 Apr 2018 07:21  Mg     2.2     04-17            RADIOLOGY & ADDITIONAL TESTS: Reviewed. INTERVAL HPI/OVERNIGHT EVENTS:  Patient lethargic overnight; did not drink Golytely. Cannot go for C-scope today.    SUBJECTIVE: Patient seen and examined at bedside. Remains somnolent and lethargic. Responds to verbal name call, painful stimuli. Unable to obtain ROS.       OBJECTIVE:    VITAL SIGNS:  ICU Vital Signs Last 24 Hrs  T(C): 37.7 (17 Apr 2018 10:08), Max: 37.7 (17 Apr 2018 10:08)  T(F): 99.9 (17 Apr 2018 10:08), Max: 99.9 (17 Apr 2018 10:08)  HR: 97 (17 Apr 2018 09:52) (71 - 97)  BP: 144/80 (17 Apr 2018 09:52) (144/80 - 179/73)  BP(mean): --  ABP: --  ABP(mean): --  RR: 18 (17 Apr 2018 09:52) (16 - 18)  SpO2: 96% (17 Apr 2018 09:52) (96% - 98%)        04-16 @ 07:01  -  04-17 @ 07:00  --------------------------------------------------------  IN: 0 mL / OUT: 1 mL / NET: -1 mL      CAPILLARY BLOOD GLUCOSE          PHYSICAL EXAM:  Constitutional: Sleeping in bed  	Head: healing bruise/erosion over L temporal region near lateral eyebrow  	Eyes: PERRL, anicteric sclera  	ENT: no nasal discharge; uvula midline, no oropharyngeal erythema or exudates; MMM, no sublingual icterus  	Neck: supple; no JVD or thyromegaly  	Respiratory: mild bibasilar crackles; no W/R/R, no retractions  	Cardiac: +S1/S2; RRR; no M/R/G; PMI non-displaced  	Gastrointestinal: abdomen soft, NT/ND; no rebound or guarding; +BSx4  	Back: tender to palpation of lower back  	Extremities: WWP, no clubbing or cyanosis; no peripheral edema  	Vascular: 2+ radial, femoral, DP/PT pulses B/L  Neuro: somnolent, arousable to painful stimuli, awakens to name call, moving extremities    MEDICATIONS:  MEDICATIONS  (STANDING):  pantoprazole    Tablet 40 milliGRAM(s) Oral before breakfast  sodium chloride 0.9%. 1000 milliLiter(s) (100 mL/Hr) IV Continuous <Continuous>    MEDICATIONS  (PRN):  acetaminophen   Tablet. 325 milliGRAM(s) Oral every 6 hours PRN Mild Pain (1 - 3)      ALLERGIES:  Allergies    penicillin (Unknown)    Intolerances        LABS:                        11.0   13.6  )-----------( 511      ( 17 Apr 2018 07:21 )             35.0     04-17    139  |  102  |  13  ----------------------------<  102<H>  4.3   |  22  |  0.84    Ca    9.1      17 Apr 2018 07:21  Mg     2.2     04-17            RADIOLOGY & ADDITIONAL TESTS: Reviewed.

## 2018-04-17 NOTE — DISCHARGE NOTE ADULT - CARE PROVIDER_API CALL
Edwar Quezada), Gastroenterology; Internal Medicine  [address]  [address]  [place] [address]  Phone: [phone]  Fax: [phone]    Arun Sow), Cardiovascular Disease; Internal Medicine  [address] [place] [id number]  Phone: [phone]  Fax: [phone]

## 2018-04-17 NOTE — DISCHARGE NOTE ADULT - ADDITIONAL INSTRUCTIONS
Please follow up with your primary care provider (Dr. Sow) within 1 week of discharge.     Please follow up with gastroenterology (Dr. Quezada) within _______ weeks of discharge. Please follow up with your primary care provider (Dr. Sow) within 1 week of discharge.     Please follow up with gastroenterology (Dr. Quezada) within 1 weeks of discharge.

## 2018-04-17 NOTE — PROGRESS NOTE ADULT - ASSESSMENT
89F PMH spinal stenosis, RA, HTN, Alzheimer's presented to [facility] from outpatient clinic with low hemoglobin, found to have Hb 7.1 with positive FOBT, scheduled for colonoscopy on Monday.

## 2018-04-17 NOTE — DISCHARGE NOTE ADULT - CARE PLAN
Principal Discharge DX:	Anemia due to blood loss  Goal:	Resolution of bleeding with outpatient GI follow up  Assessment and plan of treatment:	You were admitted to the hospital with anemia (low red blood cell counts), for which you got 2 blood transfusions. You were evaluated by gastroenterology (Dr. Quezada) and found to have a mass in part of your stomach. This mass was sent for biopsy, and you should follow up with Dr. Quezada and/or Dr. Sow for results. If you experience any further bleeding, chest pain, palpitations, shortness of breath, or dizziness upon standing, please go to the emergency room.  Secondary Diagnosis:	Rheumatoid arthritis  Assessment and plan of treatment:	Please continue only with Tylenol for pain control. Avoid ALL NSAID medications (Celebrex, ibuprofen, Advil, Aleve, aspirin), as this can cause gastrointestinal bleeding.  Secondary Diagnosis:	Spinal stenosis  Assessment and plan of treatment:	Please continue only with Tylenol for pain control. Avoid ALL NSAID medications (Celebrex, ibuprofen, Advil, Aleve, aspirin), as this can cause gastrointestinal bleeding.  Secondary Diagnosis:	Hypertension Principal Discharge DX:	Anemia due to blood loss  Goal:	Resolution of bleeding with outpatient GI follow up  Assessment and plan of treatment:	You were admitted to the hospital with anemia (low red blood cell counts), for which you got 2 blood transfusions. You were evaluated by gastroenterology (Dr. Quezada) and found to have a mass in part of your stomach. This mass was sent for biopsy, and you should follow up with Dr. Quezada and/or Dr. Sow for results. If you experience any further bleeding, chest pain, palpitations, shortness of breath, or dizziness upon standing, please go to the emergency room.  Secondary Diagnosis:	Rheumatoid arthritis  Assessment and plan of treatment:	Please continue only with Tylenol for pain control. Avoid ALL NSAID medications (Celebrex, ibuprofen, Advil, Aleve, aspirin), as this can cause gastrointestinal bleeding.  Secondary Diagnosis:	Spinal stenosis  Assessment and plan of treatment:	Please continue only with Tylenol for pain control. Avoid ALL NSAID medications (Celebrex, ibuprofen, Advil, Aleve, aspirin), as this can cause gastrointestinal bleeding.  Secondary Diagnosis:	Hypertension  Assessment and plan of treatment:	While you were in the hospital, we held your losartan due to bleeding. It ____is/is not_____ safe to resume this medication. Please follow up with Dr. Sow for further management of your high blood pressure. Principal Discharge DX:	Anemia due to blood loss  Goal:	Resolution of bleeding with outpatient GI follow up  Assessment and plan of treatment:	You were admitted to the hospital with anemia (low red blood cell counts), for which you got 2 blood transfusions. You were evaluated by gastroenterology (Dr. Quezada) and found to have a mass in part of your stomach. This mass was sent for biopsy, and you should follow up with Dr. Quezada and/or Dr. Sow for results. If you experience any further bleeding, chest pain, palpitations, shortness of breath, or dizziness upon standing, please go to the emergency room.  Secondary Diagnosis:	Rheumatoid arthritis  Assessment and plan of treatment:	Please continue only with Tylenol for pain control. Avoid ALL NSAID medications (Celebrex, ibuprofen, Advil, Aleve, aspirin), as this can cause gastrointestinal bleeding.  Secondary Diagnosis:	Spinal stenosis  Assessment and plan of treatment:	Please continue only with Tylenol for pain control. Avoid ALL NSAID medications (Celebrex, ibuprofen, Advil, Aleve, aspirin), as this can cause gastrointestinal bleeding.  Secondary Diagnosis:	Hypertension  Assessment and plan of treatment:	While you were in the hospital, we held your losartan due to bleeding. It is safe to resume this medication. Please follow up with Dr. Sow for further management of your high blood pressure.

## 2018-04-17 NOTE — PROGRESS NOTE ADULT - SUBJECTIVE AND OBJECTIVE BOX
GERONIMO MACK    HPI:  89F [facility] spinal stenosis, RA, HTN, Alzheimer's presented to [facility] from outpatient clinic with low hemoglobin. History primarily obtained from patient's son in law and patient. Patient was at routine check up earlier today, found to have low hemoglobin with no signs of bleeding noted at home. She has been weak for the past day, however no blood noted in the stool, no darkened stools, no hematemesis/hemoptysis. Denies symptoms of dizziness, headache, SOB, chest pain, abdominal pain, N/V, fevers chills. Has not taken NSAIDs, not on any blood thinners at home. Takes tylenol for arthritic pain and spinal stenosis pain, which is currently not well controlled as she is laying in bed uncomfortably. Has never been transfused blood, never had blood in the stool. Decreased PO intake recently due to lack of sleep/ decreased taste sensation. Patient fell at home 8 days ago, has bruise L forehead. Family has not noticed any neurological deficits or change in mental status. On assessment by GI in the ED, FOBT was positive, and patient admitted for colonoscopy on  Monday.     In the ED, VS were T 97, HR 76, /50, RR 18, O2 99% on RA. Labs significant for Hb 7.1, WBC 11.3, 86% neutrophils, normal coag profile, BUN 40.  Given 650mg tylenol, 1L NS bolus, ordered for 1unit RBCs. (12 Apr 2018 18:31)    pt lethargic  didnt drink prep  Allergies    penicillin (Unknown)    Intolerances        acetaminophen   Tablet. 325 milliGRAM(s) Oral every 6 hours PRN  pantoprazole    Tablet 40 milliGRAM(s) Oral before breakfast  QUEtiapine 12.5 milliGRAM(s) Oral at bedtime  sodium chloride 0.9%. 1000 milliLiter(s) IV Continuous <Continuous>      Vital Signs Last 24 Hrs  T(C): 37.7 (17 Apr 2018 10:08), Max: 37.7 (17 Apr 2018 10:08)  T(F): 99.9 (17 Apr 2018 10:08), Max: 99.9 (17 Apr 2018 10:08)  HR: 97 (17 Apr 2018 09:52) (71 - 97)  BP: 144/80 (17 Apr 2018 09:52) (144/80 - 179/73)  BP(mean): --  RR: 18 (17 Apr 2018 09:52) (16 - 18)  SpO2: 96% (17 Apr 2018 09:52) (96% - 98%)    PHYSICAL EXAM:      Constitutional:lethargic    Respiratory:cta    Cardiovascular:[id number]    Gastrointestinal:soft nt bs                          11.0   13.6  )-----------( 511      ( 17 Apr 2018 07:21 )             35.0       04-17    139  |  102  |  13  ----------------------------<  102<H>  4.3   |  22  |  0.84    Ca    9.1      17 Apr 2018 07:21  Mg     2.2     04-17        A/P  investigate lethergy  once alert reprep for colon

## 2018-04-17 NOTE — CONSULT NOTE ADULT - SUBJECTIVE AND OBJECTIVE BOX
Patient is a 89y old  Female who presents with a chief complaint of Low Hemoglobin (17 Apr 2018 02:24)       HPI:  89F [facility] spinal stenosis, RA, HTN, Alzheimer's presented to [facility] from outpatient clinic with low hemoglobin. History primarily obtained from patient's son in law and patient. Patient was at routine check up earlier today, found to have low hemoglobin with no signs of bleeding noted at home. She has been weak for the past day, however no blood noted in the stool, no darkened stools, no hematemesis/hemoptysis. Denies symptoms of dizziness, headache, SOB, chest pain, abdominal pain, N/V, fevers chills. Has not taken NSAIDs, not on any blood thinners at home. Takes tylenol for arthritic pain and spinal stenosis pain, which is currently not well controlled as she is laying in bed uncomfortably. Has never been transfused blood, never had blood in the stool. Decreased PO intake recently due to lack of sleep/ decreased taste sensation. Patient fell at home 8 days ago, has bruise L forehead. Family has not noticed any neurological deficits or change in mental status. On assessment by GI in the ED, FOBT was positive, and patient admitted for colonoscopy on  Monday.     In the ED, VS were T 97, HR 76, /50, RR 18, O2 99% on RA. Labs significant for Hb 7.1, WBC 11.3, 86% neutrophils, normal coag profile, BUN 40.  Given 650mg tylenol, 1L NS bolus, ordered for 1unit RBCs. (12 Apr 2018 18:31)      PAST MEDICAL & SURGICAL HISTORY:  Alzheimer disease  Spinal stenosis  Hypertension  Rheumatoid arthritis  Diverticulitis of colon: Diverticulitis      MEDICATIONS  (STANDING):  pantoprazole    Tablet 40 milliGRAM(s) Oral before breakfast  sodium chloride 0.9%. 1000 milliLiter(s) (100 mL/Hr) IV Continuous <Continuous>    MEDICATIONS  (PRN):  acetaminophen   Tablet. 325 milliGRAM(s) Oral every 6 hours PRN Mild Pain (1 - 3)      Social History: , has a daughter and son in law involved with her care, lives alone in an elevator accessible apartment building, has home attendant Mon thru Saturday x 3 hrs / day    Functional Level Prior to Admission: partial assist/supervision with bathing, walks with a cane    FAMILY HISTORY:      CBC Full  -  ( 17 Apr 2018 07:21 )  WBC Count : 13.6 K/uL  Hemoglobin : 11.0 g/dL  Hematocrit : 35.0 %  Platelet Count - Automated : 511 K/uL  Mean Cell Volume : 89.5 fL  Mean Cell Hemoglobin : 28.1 pg  Mean Cell Hemoglobin Concentration : 31.4 g/dL  Auto Neutrophil # : x  Auto Lymphocyte # : x  Auto Monocyte # : x  Auto Eosinophil # : x  Auto Basophil # : x  Auto Neutrophil % : x  Auto Lymphocyte % : x  Auto Monocyte % : x  Auto Eosinophil % : x  Auto Basophil % : x      04-17    139  |  102  |  13  ----------------------------<  102<H>  4.3   |  22  |  0.84    Ca    9.1      17 Apr 2018 07:21  Mg     2.2     04-17              Radiology:    < from: Xray Chest 1 View-PORTABLE IMMEDIATE (04.13.18 @ 12:30) >  EXAM:  XR CHEST PORTABLE IMMED 1V                          PROCEDURE DATE:  04/13/2018                     INTERPRETATION:  XR CHEST PORTABLE IMMED 1V dated 4/13/2018 12:30 PM    CLINICAL INFORMATION: Female, 89 years old.  sob.    PRIOR STUDIES: 9/16/2014.    FINDINGS: Heart size, mediastinal and hilar contours are unchanged.   Visualized lung zones are clear. There is no pneumothorax, consolidation   or pleural collections. Marked degenerative changes of both glenohumeral   joints with superior subluxation of the humeral heads.    IMPRESSION:  No acute pulmonary pathology.      < from: CT Head No Cont (04.17.18 @ 12:38) >  EXAM:  CT BRAIN                          PROCEDURE DATE:  04/17/2018                     INTERPRETATION:  PROCEDURE: CT brain without intravenous contrast    INDICATION: Altered mental status    TECHNIQUE: Multiple axial images were obtained at 5 mm intervals from the   skull base to the vertex. The images were reviewed in brain and bone   windows.    COMPARISON: CT brain 8/20/2017    FINDINGS: The CT examination demonstrates age appropriate volume loss.   The ventricles are stable in size. There is no midline shift or extra   axial collections. The gray white differentiation appears within normal   limits. There is no intracranial hemorrhage or acute transcortical   infarct. There is patchy areas of hypodensity within the periventricular   white matter which may represent the sequela of small vessel ischemic   disease. The bony windows demonstrates no fractures. The lenses are   absent which may be secondary to prior cataract surgery. The visualized   paranasal sinuses are within normal limits. The mastoid air cells are   well aerated.    IMPRESSION: No intracranial hemorrhage or acute transcortical infarct.   Stable exam.            Vital Signs Last 24 Hrs  T(C): 37.7 (17 Apr 2018 10:08), Max: 37.7 (17 Apr 2018 10:08)  T(F): 99.9 (17 Apr 2018 10:08), Max: 99.9 (17 Apr 2018 10:08)  HR: 97 (17 Apr 2018 09:52) (71 - 97)  BP: 144/80 (17 Apr 2018 09:52) (144/80 - 179/73)  BP(mean): --  RR: 18 (17 Apr 2018 09:52) (16 - 18)  SpO2: 96% (17 Apr 2018 09:52) (96% - 98%)    REVIEW OF SYSTEMS: patient currently somnolent, NAD    CONSTITUTIONAL: No fever, weight loss, or fatigue  EYES: No eye pain, visual disturbances, or discharge  ENMT:  No difficulty hearing, tinnitus, vertigo; No sinus or throat pain  NECK: No pain or stiffness  BREASTS: No pain, masses, or nipple discharge  RESPIRATORY: No cough, wheezing, chills or hemoptysis; No shortness of breath  CARDIOVASCULAR: No chest pain, palpitations, dizziness, or leg swelling  GASTROINTESTINAL: No abdominal or epigastric pain. No nausea, vomiting, or hematemesis; No diarrhea or constipation. No melena or hematochezia.  GENITOURINARY: No dysuria, frequency, hematuria, or incontinence  NEUROLOGICAL: No headaches, memory loss, loss of strength, numbness, or tremors  SKIN: No itching, burning, rashes, or lesions   LYMPH NODES: No enlarged glands  ENDOCRINE: No heat or cold intolerance; No hair loss  MUSCULOSKELETAL: No joint pain or swelling; No muscle, back, or extremity pain  PSYCHIATRIC: No depression, anxiety, mood swings, or difficulty sleeping  HEME/LYMPH: No easy bruising, or bleeding gums  ALLERGY AND IMMUNOLOGIC: No hives or eczema  VASCULAR: no swelling, erythema    Physical Exam: elderly frail  woman lying in semi Fowlers position, somnolent arousable, NAD    Head: normocephalic, atraumatic    Eyes: PERRLA, EOMI, no nystagmus, sclera anicteric    ENT: nasal discharge, uvula midline, no oropharyngeal erythema/exudate    Neck: supple, negative JVD, negative carotid bruits, no thyromegaly    Chest: decreased breath sounds at bases    Cardiovascular: regular rate and rhythm, neg murmurs/rubs/gallops    Abdomen: soft, non distended, non tender, negative rebound/guarding, normal bowel sounds, neg hepatosplenomegaly    Extremities: WWP, neg cyanosis/clubbing/edema, negative calf tenderness to palpation, negative Adam's sign    :     Neurologic Exam:    somnolent, arousable to verbal stimuli, speech fluent w/o dysarthria, currently oriented to self    Cranial Nerves:     II:                       pupils equal, round and reactive to light, visual fields intact   III/ IV/VI:             extraocular movements intact, neg nystagmus, ptosis  V:                       facial sensation intact, V1-3 normal  VII:                     face symmetric, no droop, normal eye closure and smile  VIII:                    hearing intact to finger rub bilaterally  IX/ X:                 soft palate rise symmetrical  XI:                      head turning, shoulder shrug normal  XII:                     tongue midline    Motor Exam:    Upper Extremities:        Right:   poor effort > 3/5 secondary to somnolence                                       Left:      poor effort > 3/5 secondary to somnolence      Lower Extremities:         Right     poor effort > 3/5 secondary to somnolence                                       Left:      poor effort > 3/5 secondary to somnolence    Sensory:              intact to LT/PP in all UE/LE dermatomes    DTR:                   = biceps/     triceps/     brachioradialis                            = patella/   medial hamstring/    ankle                            neg clonus                            neg Babinski                            neg Hoffmans      Tandem Walking:  not tested    Gait:  not tested        PM&R Impression:    1) deconditioned  2) AMS/ anemia      Recommendations:    1) Physical therapy focusing on therapeutic exercises, bed mobility/transfer out of bed evaluation, progressive ambulation with assistive devices.    2) Anticipated Disposition Plan/Recommendations:  family requesting d/c home with home physical therapy, increased home care services

## 2018-04-17 NOTE — DISCHARGE NOTE ADULT - HOSPITAL COURSE
89F PMH spinal stenosis, RA, HTN, Alzheimer's presented to [facility] from outpatient clinic with low hemoglobin, found to have Hb 7.1 with positive FOBT and admitted for work up. GI was consulted (Dr. 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 Pt went for colonoscopy on ______ which showed ______________. Of note, pt with sundowning during hospitalization and combative behavior toward staff requiring IM Haldol and Seroquel intermittently. 89F PMH spinal stenosis, RA, HTN, Alzheimer's presented to [facility] from outpatient clinic with low hemoglobin, found to have Hb 7.1 with positive FOBT and admitted for work up. GI was consulted (Dr. 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 Pt went for colonoscopy on ______ which showed ______________. Of note, pt with sundowning during hospitalization and combative behavior toward staff requiring IM Haldol and Seroquel intermittently. Pt HD stable, ready for discharge with close outpatient f/u with GI and PMD for biopsy results and continued care. 89F PMH spinal stenosis, RA, HTN, Alzheimer's presented to [facility] from outpatient clinic with low hemoglobin, found to have Hb 7.1 with positive FOBT and admitted for work up. GI was consulted (Dr. 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 Pt went for colonoscopy which showed diverticulosis without evidence of acute bleed.. Of note, pt with sundowning during hospitalization and combative behavior toward staff requiring IM Haldol and Seroquel intermittently. Pt HD stable, ready for discharge with close outpatient f/u with GI and PMD for biopsy results and continued care. Patient was medically optimized, stable and ready for discharge. Plan of care and return precautions were discussed with the patient who verbally stated understanding.

## 2018-04-17 NOTE — DISCHARGE NOTE ADULT - PATIENT PORTAL LINK FT
You can access the [facility] Patient Portal, offered by [facility], by registering with the following website: [email or web address]

## 2018-04-18 LAB
ANION GAP SERPL CALC-SCNC: 12 MMOL/L — SIGNIFICANT CHANGE UP (ref 5–17)
BASOPHILS NFR BLD AUTO: 0.2 % — SIGNIFICANT CHANGE UP (ref 0–2)
BUN SERPL-MCNC: 20 MG/DL — SIGNIFICANT CHANGE UP (ref 7–23)
CALCIUM SERPL-MCNC: 9.3 MG/DL — SIGNIFICANT CHANGE UP (ref 8.4–10.5)
CHLORIDE SERPL-SCNC: 100 MMOL/L — SIGNIFICANT CHANGE UP (ref 96–108)
CO2 SERPL-SCNC: 27 MMOL/L — SIGNIFICANT CHANGE UP (ref 22–31)
CREAT SERPL-MCNC: 1.04 MG/DL — SIGNIFICANT CHANGE UP (ref 0.5–1.3)
CRP SERPL-MCNC: 11.99 MG/DL — HIGH (ref 0–0.4)
EOSINOPHIL NFR BLD AUTO: 0.3 % — SIGNIFICANT CHANGE UP (ref 0–6)
ERYTHROCYTE [SEDIMENTATION RATE] IN BLOOD: 38 MM/HR — HIGH
GLUCOSE SERPL-MCNC: 106 MG/DL — HIGH (ref 70–99)
HCT VFR BLD CALC: 33.8 % — LOW (ref 34.5–45)
HGB BLD-MCNC: 10.7 G/DL — LOW (ref 11.5–15.5)
LYMPHOCYTES # BLD AUTO: 8 % — LOW (ref 13–44)
MAGNESIUM SERPL-MCNC: 2.2 MG/DL — SIGNIFICANT CHANGE UP (ref 1.6–2.6)
MCHC RBC-ENTMCNC: 28.4 PG — SIGNIFICANT CHANGE UP (ref 27–34)
MCHC RBC-ENTMCNC: 31.7 G/DL — LOW (ref 32–36)
MCV RBC AUTO: 89.7 FL — SIGNIFICANT CHANGE UP (ref 80–100)
MONOCYTES NFR BLD AUTO: 7.6 % — SIGNIFICANT CHANGE UP (ref 2–14)
NEUTROPHILS NFR BLD AUTO: 83.9 % — HIGH (ref 43–77)
PLATELET # BLD AUTO: 495 K/UL — HIGH (ref 150–400)
POTASSIUM SERPL-MCNC: 4.2 MMOL/L — SIGNIFICANT CHANGE UP (ref 3.5–5.3)
POTASSIUM SERPL-SCNC: 4.2 MMOL/L — SIGNIFICANT CHANGE UP (ref 3.5–5.3)
RBC # BLD: 3.77 M/UL — LOW (ref 3.8–5.2)
RBC # FLD: 14.8 % — SIGNIFICANT CHANGE UP (ref 10.3–16.9)
SODIUM SERPL-SCNC: 139 MMOL/L — SIGNIFICANT CHANGE UP (ref 135–145)
WBC # BLD: 15.2 K/UL — HIGH (ref 3.8–10.5)
WBC # FLD AUTO: 15.2 K/UL — HIGH (ref 3.8–10.5)

## 2018-04-18 PROCEDURE — 99221 1ST HOSP IP/OBS SF/LOW 40: CPT

## 2018-04-18 PROCEDURE — 73560 X-RAY EXAM OF KNEE 1 OR 2: CPT | Mod: 26,RT

## 2018-04-18 RX ORDER — SOD SULF/SODIUM/NAHCO3/KCL/PEG
4000 SOLUTION, RECONSTITUTED, ORAL ORAL ONCE
Qty: 0 | Refills: 0 | Status: COMPLETED | OUTPATIENT
Start: 2018-04-18 | End: 2018-04-20

## 2018-04-18 RX ADMIN — Medication 325 MILLIGRAM(S): at 21:29

## 2018-04-18 RX ADMIN — Medication 325 MILLIGRAM(S): at 15:02

## 2018-04-18 RX ADMIN — Medication 325 MILLIGRAM(S): at 14:01

## 2018-04-18 RX ADMIN — Medication 325 MILLIGRAM(S): at 22:29

## 2018-04-18 RX ADMIN — PANTOPRAZOLE SODIUM 40 MILLIGRAM(S): 20 TABLET, DELAYED RELEASE ORAL at 06:07

## 2018-04-18 NOTE — CHART NOTE - NSCHARTNOTEFT_GEN_A_CORE
HOSPITAL COURSE:    89F PMH spinal stenosis, RA, HTN, Alzheimer's presented to [facility] from outpatient clinic with low hemoglobin, found to have Hb 7.1 with positive FOBT and admitted for work up. GI was consulted (Dr. 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 Pt went for colonoscopy on ______ which showed ______________. Of note, pt with sundowning during hospitalization and combative behavior toward staff requiring IM Haldol and Seroquel intermittently. Patient with prolonged somnolence following EGD and seroquel, CT head ordered, NAP.

## 2018-04-18 NOTE — CONSULT NOTE ADULT - SUBJECTIVE AND OBJECTIVE BOX
Pt Name: GERONIMO MACK  MRN: [id number]      Pt is a 89yo Female c/o right knee pain x 1- days.  Pt. hard of hearing and has hx of Alzheimers, difficult to obtain history. Pt. did c/o of right knee pain for several days. Per primary team pt. has limited ROM and and was ambulatroy prior to admission. Pt. currently doing transfers per PCA. .  Takes tylenol for arthritic pain and spinal stenosis pain. Patient fell at home 8 days ago, has bruise L forehead. Family has not noticed any neurological deficits or change in mental status. On assessment by GI in the ED, FOBT was positive, and patient admitted for colonoscopy on  Monday.       ROS is otherwise negative.    PAST MEDICAL & SURGICAL HISTORY:  Alzheimer disease  Spinal stenosis  Hypertension  Weak  Rheumatoid arthritis  Diverticulitis of colon: Diverticulitis      Allergies-penicillin    Medications:acetaminophen   Tablet. 325 milliGRAM(s) Oral every 6 hours PRN  pantoprazole    Tablet 40 milliGRAM(s) Oral before breakfast  polyethylene glycol/electrolyte Solution. 4000 milliLiter(s) Oral once    Ambulation: Walking independently [ ] With Cane [ ] With Walker [ ]  Bedbound [ ] u    PHYSICAL EXAM:    T(C): 37.3 (04-18-18 @ 16:50), Max: 37.4 (04-17-18 @ 21:00)  HR: 70 (04-18-18 @ 16:50) (70 - 92)  BP: 129/70 (04-18-18 @ 16:50) (121/74 - 143/81)  RR: 18 (04-18-18 @ 16:50) (17 - 18)  SpO2: 97% (04-18-18 @ 16:50) (97% - 97%)  Wt(kg): --    Gen: NAD, pt. hard of hearing difficult to communicate with or gain history 2/2 alzheimers. Sitting in bed with right knee elevated on pillows with ice pack   Neurological: no focal deficits  Skin: skin intact, + severe STS, both knee joints warm, no ecchymosis  Musculoskeletal:  QUAD/HAM/EHL/TA/GS 5/5   DP/PT 2+ B/L LES  SLT INTACT   ROM: RIGHT KNEE AROM 0-100 with pain end ROM     WBC 13.6, 15.2, ESR-38 CRP11.99      Imaging Studies: Right knee severe OA, no fx/dislocation noted, suprapatella effusion     A/P:  Pt is a 89yo Female  with right knee effusion and severe osteoarthritis (hx of RA)   -D/W Dr. Chandler  -low suspicion for septic knee, pt. afebrile, hx of RA (possible cause for elevated inflammatory markers) no aspiration for intervention at this time   -xrays show severe OA with knee effusion  -PT/OOB/WBAT  continue with meloxicam or celebrex/anti inflammatory for knee pain/swelling (pt. has not followed up with rheumatologist)   -rest/ice  - ORTHO TO FOLLOW Pt Name: GERONIMO MACK  MRN: [id number]      Pt is a 89yo Female c/o right knee pain x 1- days.  Pt. hard of hearing and has hx of Alzheimers, difficult to obtain history. Pt. did c/o of right knee pain for several days. Per primary team pt. has limited ROM and and was ambulatroy prior to admission. Pt. currently doing transfers per PCA. .  Takes tylenol for arthritic pain and spinal stenosis pain. Patient fell at home 8 days ago, has bruise L forehead. Family has not noticed any neurological deficits or change in mental status. On assessment by GI in the ED, FOBT was positive, and patient admitted for colonoscopy on  Monday.       ROS is otherwise negative.    PAST MEDICAL & SURGICAL HISTORY:  Alzheimer disease  Spinal stenosis  Hypertension  Weak  Rheumatoid arthritis  Diverticulitis of colon: Diverticulitis      Allergies-penicillin    Medications:acetaminophen   Tablet. 325 milliGRAM(s) Oral every 6 hours PRN  pantoprazole    Tablet 40 milliGRAM(s) Oral before breakfast  polyethylene glycol/electrolyte Solution. 4000 milliLiter(s) Oral once    Ambulation: Walking independently [ x] With Cane [ ] With Walker [ ]  Bedbound [ ] u    PHYSICAL EXAM:    T(C): 37.3 (04-18-18 @ 16:50), Max: 37.4 (04-17-18 @ 21:00)  HR: 70 (04-18-18 @ 16:50) (70 - 92)  BP: 129/70 (04-18-18 @ 16:50) (121/74 - 143/81)  RR: 18 (04-18-18 @ 16:50) (17 - 18)  SpO2: 97% (04-18-18 @ 16:50) (97% - 97%)  Wt(kg): --    Gen: NAD, pt. hard of hearing difficult to communicate with or gain history 2/2 alzheimers. Sitting in bed with right knee elevated on pillows with ice pack   Neurological: no focal deficits  Skin: skin intact, + severe STS, both knee joints warm, no ecchymosis  Musculoskeletal:  QUAD/HAM/EHL/TA/GS 5/5   DP/PT 2+ B/L LES  SLT INTACT   ROM: RIGHT KNEE AROM 0-100 with pain end ROM     WBC 13.6, 15.2, ESR-38 CRP11.99      Imaging Studies: Right knee severe OA, no fx/dislocation noted, suprapatella effusion     A/P:  Pt is a 89yo Female  with right knee effusion and severe osteoarthritis (hx of RA)   -D/W Dr. Chandler  -low suspicion for septic knee, pt. afebrile, hx of RA (possible cause for elevated inflammatory markers) no aspiration for intervention at this time   -xrays show severe OA with knee effusion  -PT/OOB/WBAT  continue with meloxicam or celebrex/anti inflammatory for knee pain/swelling (pt. has not followed up with rheumatologist)   -rest/ice  - ORTHO TO FOLLOW

## 2018-04-18 NOTE — PROGRESS NOTE ADULT - SUBJECTIVE AND OBJECTIVE BOX
GERONIMO MACK    HPI:  89F PMH spinal stenosis, RA, HTN, Alzheimer's presented to [facility] from outpatient clinic with low hemoglobin. History primarily obtained from patient's son in law and patient. Patient was at routine check up earlier today, found to have low hemoglobin with no signs of bleeding noted at home. She has been weak for the past day, however no blood noted in the stool, no darkened stools, no hematemesis/hemoptysis. Denies symptoms of dizziness, headache, SOB, chest pain, abdominal pain, N/V, fevers chills. Has not taken NSAIDs, not on any blood thinners at home. Takes tylenol for arthritic pain and spinal stenosis pain, which is currently not well controlled as she is laying in bed uncomfortably. Has never been transfused blood, never had blood in the stool. Decreased PO intake recently due to lack of sleep/ decreased taste sensation. Patient fell at home 8 days ago, has bruise L forehead. Family has not noticed any neurological deficits or change in mental status. On assessment by GI in the ED, FOBT was positive, and patient admitted for colonoscopy on  Monday.     In the ED, VS were T 97, HR 76, /50, RR 18, O2 99% on RA. Labs significant for Hb 7.1, WBC 11.3, 86% neutrophils, normal coag profile, BUN 40.  Given 650mg tylenol, 1L NS bolus, ordered for 1unit RBCs. (12 Apr 2018 18:31)    no acute events  shoulder pain better  Allergies    penicillin (Unknown)    Intolerances        acetaminophen   Tablet. 325 milliGRAM(s) Oral every 6 hours PRN  pantoprazole    Tablet 40 milliGRAM(s) Oral before breakfast      Vital Signs Last 24 Hrs  T(C): 37.2 (18 Apr 2018 05:01), Max: 37.7 (17 Apr 2018 10:08)  T(F): 99 (18 Apr 2018 05:01), Max: 99.9 (17 Apr 2018 10:08)  HR: 81 (18 Apr 2018 05:01) (81 - 113)  BP: 143/81 (18 Apr 2018 05:01) (121/74 - 144/80)  BP(mean): --  RR: 18 (18 Apr 2018 05:01) (17 - 18)  SpO2: 97% (18 Apr 2018 05:01) (96% - 98%)    PHYSICAL EXAM:      Constitutional:nad    Respiratory:cta    Cardiovascular:[id number]    Gastrointestinal:soft nt bs                              11.0   13.6  )-----------( 511      ( 17 Apr 2018 07:21 )             35.0       04-17    139  |  102  |  13  ----------------------------<  102<H>  4.3   |  22  |  0.84    Ca    9.1      17 Apr 2018 07:21  Mg     2.2     04-17        A/P  pyloric bx- normal.  for colonoscopy tomorrow

## 2018-04-18 NOTE — PROGRESS NOTE ADULT - PROBLEM SELECTOR PLAN 1
-Hb 7.1 on admission in setting of positive FOBT; evaluated by Dr. Quezada in ED who scheduled patient for colonoscopy on Monday however due to inadequate prep will be done Wednesday  -[facility] prep tonight. NPO at 12AM  -protonix 40 PO daily per Dr. 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

## 2018-04-18 NOTE — PROGRESS NOTE ADULT - PROBLEM SELECTOR PLAN 2
-c/w tylenol 325 mg q6h PRN for moderate pain  -does not follow with outpatient rheumatologist  -has taken Celebrex in past but per daughter, has not used in a long time, unable to specify when d/c-ed  -per Dr. Sow, may have been taking meloxicam at home  -new joint effusion R shoulder, f/u xray

## 2018-04-18 NOTE — PROGRESS NOTE ADULT - SUBJECTIVE AND OBJECTIVE BOX
INTERVAL HPI/OVERNIGHT EVENTS:  AUTUMN overnight.    SUBJECTIVE: Patient seen and examined at bedside. Reporting R and L shoulder pain this morning. Denies CP, SOB, abdominal pain.     ROS:  CV: Denies chest pain  Resp: Denies SOB  GI: Denies abdominal pain, constipation, diarrhea, nausea, vomiting  : Denies dysuria  ID: Denies fevers, chills  MSK: +joint pain     OBJECTIVE:    VITAL SIGNS:  ICU Vital Signs Last 24 Hrs  T(C): 37.2 (18 Apr 2018 09:37), Max: 37.4 (17 Apr 2018 21:00)  T(F): 98.9 (18 Apr 2018 09:37), Max: 99.3 (17 Apr 2018 21:00)  HR: 88 (18 Apr 2018 09:37) (81 - 113)  BP: 127/72 (18 Apr 2018 09:37) (121/74 - 143/81)  BP(mean): --  ABP: --  ABP(mean): --  RR: 18 (18 Apr 2018 09:37) (17 - 18)  SpO2: 97% (18 Apr 2018 09:37) (97% - 98%)        CAPILLARY BLOOD GLUCOSE          PHYSICAL EXAM:  General: NAD, resting comfortably in bed  HEENT: NC/AT; PERRL, clear conjunctiva, EOMI, MMM  Neck: supple, no JVD or LAD  Respiratory: CTAB, no rales/rhonchi/crackles/wheezes  Cardiovascular: +S1/S2; RRR, no murmurs  Abdomen: soft, NT/ND; +BS x4  Extremities: WWP, 2+ peripheral pulses b/l; pitting edema calves. R shoulder with palpable effusion. L posterior shoulder tender to palpation.  Skin: normal color and turgor; no rash  Neurological: no gross focal neurological deficits, awake and alert this AM    MEDICATIONS:  MEDICATIONS  (STANDING):  pantoprazole    Tablet 40 milliGRAM(s) Oral before breakfast    MEDICATIONS  (PRN):  acetaminophen   Tablet. 325 milliGRAM(s) Oral every 6 hours PRN Mild Pain (1 - 3)      ALLERGIES:  Allergies    penicillin (Unknown)    Intolerances        LABS:                        10.7   15.2  )-----------( 495      ( 18 Apr 2018 06:59 )             33.8     04-18    139  |  100  |  20  ----------------------------<  106<H>  4.2   |  27  |  1.04    Ca    9.3      18 Apr 2018 06:59  Mg     2.2     04-18            RADIOLOGY & ADDITIONAL TESTS: Reviewed.

## 2018-04-18 NOTE — PROGRESS NOTE ADULT - PROBLEM SELECTOR PLAN 5
-clear liquid diet; NPO after 12AM tonight  -Replete lytes PRN  -No IVFs  -no HSQ in setting of bleed    Dispo: [facility]

## 2018-04-18 NOTE — PROGRESS NOTE ADULT - ASSESSMENT
89F PMH spinal stenosis, RA, HTN, Alzheimer's presented to [facility] from outpatient clinic with low hemoglobin, found to have Hb 7.1 with positive FOBT, scheduled for colonoscopy on Thursday.

## 2018-04-19 LAB
ANION GAP SERPL CALC-SCNC: 12 MMOL/L — SIGNIFICANT CHANGE UP (ref 5–17)
BUN SERPL-MCNC: 29 MG/DL — HIGH (ref 7–23)
CALCIUM SERPL-MCNC: 8.9 MG/DL — SIGNIFICANT CHANGE UP (ref 8.4–10.5)
CHLORIDE SERPL-SCNC: 98 MMOL/L — SIGNIFICANT CHANGE UP (ref 96–108)
CO2 SERPL-SCNC: 27 MMOL/L — SIGNIFICANT CHANGE UP (ref 22–31)
CREAT SERPL-MCNC: 0.85 MG/DL — SIGNIFICANT CHANGE UP (ref 0.5–1.3)
GLUCOSE SERPL-MCNC: 113 MG/DL — HIGH (ref 70–99)
HCT VFR BLD CALC: 33.2 % — LOW (ref 34.5–45)
HGB BLD-MCNC: 10.5 G/DL — LOW (ref 11.5–15.5)
MAGNESIUM SERPL-MCNC: 2.2 MG/DL — SIGNIFICANT CHANGE UP (ref 1.6–2.6)
MCHC RBC-ENTMCNC: 28.4 PG — SIGNIFICANT CHANGE UP (ref 27–34)
MCHC RBC-ENTMCNC: 31.6 G/DL — LOW (ref 32–36)
MCV RBC AUTO: 89.7 FL — SIGNIFICANT CHANGE UP (ref 80–100)
PLATELET # BLD AUTO: 423 K/UL — HIGH (ref 150–400)
POTASSIUM SERPL-MCNC: 4.1 MMOL/L — SIGNIFICANT CHANGE UP (ref 3.5–5.3)
POTASSIUM SERPL-SCNC: 4.1 MMOL/L — SIGNIFICANT CHANGE UP (ref 3.5–5.3)
RBC # BLD: 3.7 M/UL — LOW (ref 3.8–5.2)
RBC # FLD: 14.6 % — SIGNIFICANT CHANGE UP (ref 10.3–16.9)
SODIUM SERPL-SCNC: 137 MMOL/L — SIGNIFICANT CHANGE UP (ref 135–145)
WBC # BLD: 11.7 K/UL — HIGH (ref 3.8–10.5)
WBC # FLD AUTO: 11.7 K/UL — HIGH (ref 3.8–10.5)

## 2018-04-19 PROCEDURE — 93970 EXTREMITY STUDY: CPT | Mod: 26

## 2018-04-19 RX ADMIN — Medication 325 MILLIGRAM(S): at 03:48

## 2018-04-19 RX ADMIN — Medication 5 MILLIGRAM(S): at 22:38

## 2018-04-19 RX ADMIN — Medication 325 MILLIGRAM(S): at 04:48

## 2018-04-19 RX ADMIN — PANTOPRAZOLE SODIUM 40 MILLIGRAM(S): 20 TABLET, DELAYED RELEASE ORAL at 06:48

## 2018-04-19 NOTE — PROGRESS NOTE ADULT - SUBJECTIVE AND OBJECTIVE BOX
GERONIMO MACK    HPI:  89F [facility] spinal stenosis, RA, HTN, Alzheimer's presented to [facility] from outpatient clinic with low hemoglobin. History primarily obtained from patient's son in law and patient. Patient was at routine check up earlier today, found to have low hemoglobin with no signs of bleeding noted at home. She has been weak for the past day, however no blood noted in the stool, no darkened stools, no hematemesis/hemoptysis. Denies symptoms of dizziness, headache, SOB, chest pain, abdominal pain, N/V, fevers chills. Has not taken NSAIDs, not on any blood thinners at home. Takes tylenol for arthritic pain and spinal stenosis pain, which is currently not well controlled as she is laying in bed uncomfortably. Has never been transfused blood, never had blood in the stool. Decreased PO intake recently due to lack of sleep/ decreased taste sensation. Patient fell at home 8 days ago, has bruise L forehead. Family has not noticed any neurological deficits or change in mental status. On assessment by GI in the ED, FOBT was positive, and patient admitted for colonoscopy on  Monday.     In the ED, VS were T 97, HR 76, /50, RR 18, O2 99% on RA. Labs significant for Hb 7.1, WBC 11.3, 86% neutrophils, normal coag profile, BUN 40.  Given 650mg tylenol, 1L NS bolus, ordered for 1unit RBCs. (12 Apr 2018 18:31)    didnt take the prep    Allergies    alprazolam (Other (Unknown))  chocolate (Unknown)  iodine (Anaphylaxis)  levofloxacin (Unknown)  Nuts (Unknown)  penicillin (Unknown)  shellfish (Short breath)    Intolerances        acetaminophen   Tablet. 325 milliGRAM(s) Oral every 6 hours PRN  pantoprazole    Tablet 40 milliGRAM(s) Oral before breakfast  polyethylene glycol/electrolyte Solution. 4000 milliLiter(s) Oral once      Vital Signs Last 24 Hrs  T(C): 36.4 (19 Apr 2018 08:36), Max: 37.3 (18 Apr 2018 16:50)  T(F): 97.6 (19 Apr 2018 08:36), Max: 99.1 (18 Apr 2018 16:50)  HR: 73 (19 Apr 2018 08:36) (70 - 871)  BP: 155/81 (19 Apr 2018 08:36) (106/63 - 155/81)  BP(mean): --  RR: 17 (19 Apr 2018 08:36) (17 - 18)  SpO2: 100% (19 Apr 2018 08:36) (96% - 100%)    PHYSICAL EXAM:      Constitutional:nad    Respiratory:cta    Cardiovascular:[id number]    Gastrointestinal:soft nt bs                              10.5   11.7  )-----------( 423      ( 19 Apr 2018 06:15 )             33.2       04-19    137  |  98  |  29<H>  ----------------------------<  113<H>  4.1   |  27  |  0.85    Ca    8.9      19 Apr 2018 06:15  Mg     2.2     04-19        A/P  reprep for colonoscopy

## 2018-04-19 NOTE — PROGRESS NOTE ADULT - ASSESSMENT
89F PMH spinal stenosis, RA, HTN, Alzheimer's presented to [facility] from outpatient clinic with low hemoglobin, found to have Hb 7.1 with positive FOBT, scheduled for colonoscopy on Thursday. 89F [facility] spinal stenosis, RA, HTN, Alzheimer's presented to [facility] from outpatient clinic with low hemoglobin, found to have Hb 7.1 with positive FOBT, scheduled for colonoscopy on today.

## 2018-04-19 NOTE — CHART NOTE - NSCHARTNOTEFT_GEN_A_CORE
Admitting Diagnosis:   89F PMH spinal stenosis, RA, HTN, Alzheimer's presented to [facility] from outpatient clinic with low hemoglobin, found to have Hb 7.1 with positive FOBT, scheduled for colonoscopy on today.         PAST MEDICAL & SURGICAL HISTORY:  Alzheimer disease  Spinal stenosis  Hypertension  Rheumatoid arthritis  Diverticulitis of colon: Diverticulitis      Current Nutrition Order: NPO except meds        PO Intake: Good (%) [   ]  Fair (50-75%) [   ] Poor (<25%) [   ]    GI Issues: no complaints; on golytlely for bowel prep-bm x2 this am    Pain: none    Skin Integrity: intact    Labs:   04-19    137  |  98  |  29<H>  ----------------------------<  113<H>  4.1   |  27  |  0.85    Ca    8.9      19 Apr 2018 06:15  Mg     2.2     04-19      CAPILLARY BLOOD GLUCOSE          Medications:  MEDICATIONS  (STANDING):  pantoprazole    Tablet 40 milliGRAM(s) Oral before breakfast  polyethylene glycol/electrolyte Solution. 4000 milliLiter(s) Oral once    MEDICATIONS  (PRN):  acetaminophen   Tablet. 325 milliGRAM(s) Oral every 6 hours PRN Mild Pain (1 - 3)      Weight:   4/12: 45.4kg     Weight Change: no new wt to assess status    Estimated energy needs: Ht: 152.4cm, wt (4/12) 45.4kg, IBW: 45.5kg   %IBW: 100%  BMI:  19.5; ABW utilized for nutritional needs as it is within % of IBW; needs adjusted per -replenishment per age d/t suspected malnutrition  25-30kcal/ABW: 1135-1362kcal  protein: 1.2-1.4g/ABW: 54.5-63.6G  Fluid: 30-35ml/ABW: 1362-1589ml    Subjective: pt is very lethargic, sleeping (was not able to sleep during the night per rn/cna); today npo for colonoscopy, has been drinking Golytely over the night; unable to obtain information re: po intake w/ solids;  w/ no GI distress; skin intact w/ 1+ R knee, 2+ generalized edema.      Previous Nutrition Diagnosis: Malnutrition; suspected moderate PCM RT inability to meet needs po AEB pt reports unintentional wt loss and consuming~50% or less of meals >7 days; NFPA      Active [X   ]  Resolved [   ]    If resolved, new PES:     Goal: PT TO MEET > 75% EER via po    Recommendations:   -please advance diet to regular and provide Ensure Enlive  x1/d (350kcal, 20g protein) if feasible after procedure  -provide MVI X1/d  -check wt x2/week    Education: n/a-pt is sleeping    Risk Level: High [  X ] Moderate [   ] Low [   ]

## 2018-04-19 NOTE — PROGRESS NOTE ADULT - PROBLEM SELECTOR PLAN 1
-Hb 7.1 on admission in setting of positive FOBT; evaluated by Dr. Quezada in ED who scheduled patient for colonoscopy on Monday however due to inadequate prep will be done Wednesday  -[facility] prep tonight. NPO at 12AM  -protonix 40 PO daily per Dr. 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 -Hb 7.1 on admission in setting of positive FOBT; evaluated by Dr. Quezada in ED who scheduled patient for colonoscopy on Monday however due to inadequate prep will be done Wednesday  -Golytely prep not being tolerated. Stool still not clear.   -protonix 40 PO daily per Dr. 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

## 2018-04-19 NOTE — PROGRESS NOTE ADULT - PROBLEM SELECTOR PLAN 2
-c/w tylenol 325 mg q6h PRN for moderate pain  -does not follow with outpatient rheumatologist  -has taken Celebrex in past but per daughter, has not used in a long time, unable to specify when d/c-ed  -per Dr. Sow, may have been taking meloxicam at home  -new joint effusion R shoulder, f/u xray -c/w tylenol 325 mg q6h PRN for moderate pain  -does not follow with outpatient rheumatologist  -has taken Celebrex in past but per daughter, has not used in a long time, unable to specify when d/c-ed  -per Dr. Sow, may have been taking meloxicam at home  -new joint effusion R shoulder, f/u xray - No fracture or acute pathology.  -joint effusion, R knee. Ortho following. No intervention at this time.

## 2018-04-19 NOTE — PROGRESS NOTE ADULT - SUBJECTIVE AND OBJECTIVE BOX
SUBJECTIVE / INTERVAL HPI: Patient seen and examined at bedside.     VITAL SIGNS:  Vital Signs Last 24 Hrs  T(C): 36.6 (19 Apr 2018 05:20), Max: 37.3 (18 Apr 2018 16:50)  T(F): 97.9 (19 Apr 2018 05:20), Max: 99.1 (18 Apr 2018 16:50)  HR: 871 (19 Apr 2018 05:20) (70 - 871)  BP: 106/63 (19 Apr 2018 05:20) (106/63 - 129/70)  BP(mean): --  RR: 17 (19 Apr 2018 05:20) (17 - 18)  SpO2: 96% (19 Apr 2018 05:20) (96% - 97%)    PHYSICAL EXAM:    General: WDWN  HEENT: NC/AT; PERRL, clear conjunctiva  Neck: supple  Cardiovascular: +S1/S2; RRR  Respiratory: CTA b/l; no W/R/R  Gastrointestinal: soft, NT/ND; +BSx4  Extremities: WWP; 2+ peripheral pulses; no edema   Neurological: AAOx3; no focal deficits    MEDICATIONS:  MEDICATIONS  (STANDING):  pantoprazole    Tablet 40 milliGRAM(s) Oral before breakfast  polyethylene glycol/electrolyte Solution. 4000 milliLiter(s) Oral once    MEDICATIONS  (PRN):  acetaminophen   Tablet. 325 milliGRAM(s) Oral every 6 hours PRN Mild Pain (1 - 3)      ALLERGIES:  Allergies    alprazolam (Other (Unknown))  chocolate (Unknown)  iodine (Anaphylaxis)  levofloxacin (Unknown)  Nuts (Unknown)  penicillin (Unknown)  shellfish (Short breath)    Intolerances        LABS:                        10.7   15.2  )-----------( 495      ( 18 Apr 2018 06:59 )             33.8     04-18    139  |  100  |  20  ----------------------------<  106<H>  4.2   |  27  |  1.04    Ca    9.3      18 Apr 2018 06:59  Mg     2.2     04-18          CAPILLARY BLOOD GLUCOSE          RADIOLOGY & ADDITIONAL TESTS: Reviewed. SUBJECTIVE / INTERVAL HPI: Difficulty tolerating colonoscopy prep overnight. Sugar added. Patient seen and examined at bedside. Stool not clear this morning. No complaints.     VITAL SIGNS:  Vital Signs Last 24 Hrs  T(C): 36.6 (19 Apr 2018 05:20), Max: 37.3 (18 Apr 2018 16:50)  T(F): 97.9 (19 Apr 2018 05:20), Max: 99.1 (18 Apr 2018 16:50)  HR: 871 (19 Apr 2018 05:20) (70 - 871)  BP: 106/63 (19 Apr 2018 05:20) (106/63 - 129/70)  BP(mean): --  RR: 17 (19 Apr 2018 05:20) (17 - 18)  SpO2: 96% (19 Apr 2018 05:20) (96% - 97%)    PHYSICAL EXAM:    General: NAD, resting comfortably in bed  HEENT: NC/AT; PERRL, clear conjunctiva, EOMI, MMM  Neck: supple, no JVD or LAD  Respiratory: CTAB, no rales/rhonchi/crackles/wheezes  Cardiovascular: +S1/S2; RRR, no murmurs  Abdomen: soft, NT/ND; +BS x4  Extremities: WWP, 2+ peripheral pulses b/l; pitting edema calves. R shoulder with palpable effusion. L posterior shoulder tender to palpation.  Skin: normal color and turgor; no rash  Neurological: no gross focal neurological deficits.      MEDICATIONS:  MEDICATIONS  (STANDING):  pantoprazole    Tablet 40 milliGRAM(s) Oral before breakfast  polyethylene glycol/electrolyte Solution. 4000 milliLiter(s) Oral once    MEDICATIONS  (PRN):  acetaminophen   Tablet. 325 milliGRAM(s) Oral every 6 hours PRN Mild Pain (1 - 3)      ALLERGIES:  Allergies    alprazolam (Other (Unknown))  chocolate (Unknown)  iodine (Anaphylaxis)  levofloxacin (Unknown)  Nuts (Unknown)  penicillin (Unknown)  shellfish (Short breath)    Intolerances        LABS:                        10.7   15.2  )-----------( 495      ( 18 Apr 2018 06:59 )             33.8     04-18    139  |  100  |  20  ----------------------------<  106<H>  4.2   |  27  |  1.04    Ca    9.3      18 Apr 2018 06:59  Mg     2.2     04-18          CAPILLARY BLOOD GLUCOSE          RADIOLOGY & ADDITIONAL TESTS: Reviewed.

## 2018-04-20 LAB
ANION GAP SERPL CALC-SCNC: 12 MMOL/L — SIGNIFICANT CHANGE UP (ref 5–17)
BUN SERPL-MCNC: 24 MG/DL — HIGH (ref 7–23)
CALCIUM SERPL-MCNC: 9 MG/DL — SIGNIFICANT CHANGE UP (ref 8.4–10.5)
CHLORIDE SERPL-SCNC: 99 MMOL/L — SIGNIFICANT CHANGE UP (ref 96–108)
CO2 SERPL-SCNC: 28 MMOL/L — SIGNIFICANT CHANGE UP (ref 22–31)
CREAT SERPL-MCNC: 0.68 MG/DL — SIGNIFICANT CHANGE UP (ref 0.5–1.3)
GLUCOSE SERPL-MCNC: 87 MG/DL — SIGNIFICANT CHANGE UP (ref 70–99)
HCT VFR BLD CALC: 32.7 % — LOW (ref 34.5–45)
HGB BLD-MCNC: 10.3 G/DL — LOW (ref 11.5–15.5)
MAGNESIUM SERPL-MCNC: 2.1 MG/DL — SIGNIFICANT CHANGE UP (ref 1.6–2.6)
MCHC RBC-ENTMCNC: 28.1 PG — SIGNIFICANT CHANGE UP (ref 27–34)
MCHC RBC-ENTMCNC: 31.5 G/DL — LOW (ref 32–36)
MCV RBC AUTO: 89.3 FL — SIGNIFICANT CHANGE UP (ref 80–100)
PHOSPHATE SERPL-MCNC: 3.1 MG/DL — SIGNIFICANT CHANGE UP (ref 2.5–4.5)
PLATELET # BLD AUTO: 429 K/UL — HIGH (ref 150–400)
POTASSIUM SERPL-MCNC: 3.5 MMOL/L — SIGNIFICANT CHANGE UP (ref 3.5–5.3)
POTASSIUM SERPL-SCNC: 3.5 MMOL/L — SIGNIFICANT CHANGE UP (ref 3.5–5.3)
RBC # BLD: 3.66 M/UL — LOW (ref 3.8–5.2)
RBC # FLD: 14.3 % — SIGNIFICANT CHANGE UP (ref 10.3–16.9)
SODIUM SERPL-SCNC: 139 MMOL/L — SIGNIFICANT CHANGE UP (ref 135–145)
WBC # BLD: 7.8 K/UL — SIGNIFICANT CHANGE UP (ref 3.8–10.5)
WBC # FLD AUTO: 7.8 K/UL — SIGNIFICANT CHANGE UP (ref 3.8–10.5)

## 2018-04-20 RX ORDER — LOSARTAN POTASSIUM 100 MG/1
25 TABLET, FILM COATED ORAL DAILY
Qty: 0 | Refills: 0 | Status: DISCONTINUED | OUTPATIENT
Start: 2018-04-20 | End: 2018-04-23

## 2018-04-20 RX ORDER — POTASSIUM CHLORIDE 20 MEQ
40 PACKET (EA) ORAL ONCE
Qty: 0 | Refills: 0 | Status: COMPLETED | OUTPATIENT
Start: 2018-04-20 | End: 2018-04-20

## 2018-04-20 RX ADMIN — PANTOPRAZOLE SODIUM 40 MILLIGRAM(S): 20 TABLET, DELAYED RELEASE ORAL at 07:28

## 2018-04-20 RX ADMIN — Medication 5 MILLIGRAM(S): at 07:28

## 2018-04-20 RX ADMIN — Medication 4000 MILLILITER(S): at 10:36

## 2018-04-20 RX ADMIN — Medication 40 MILLIEQUIVALENT(S): at 20:07

## 2018-04-20 NOTE — PROGRESS NOTE ADULT - SUBJECTIVE AND OBJECTIVE BOX
GERONIMO MACK    HPI:  89F [facility] spinal stenosis, RA, HTN, Alzheimer's presented to [facility] from outpatient clinic with low hemoglobin. History primarily obtained from patient's son in law and patient. Patient was at routine check up earlier today, found to have low hemoglobin with no signs of bleeding noted at home. She has been weak for the past day, however no blood noted in the stool, no darkened stools, no hematemesis/hemoptysis. Denies symptoms of dizziness, headache, SOB, chest pain, abdominal pain, N/V, fevers chills. Has not taken NSAIDs, not on any blood thinners at home. Takes tylenol for arthritic pain and spinal stenosis pain, which is currently not well controlled as she is laying in bed uncomfortably. Has never been transfused blood, never had blood in the stool. Decreased PO intake recently due to lack of sleep/ decreased taste sensation. Patient fell at home 8 days ago, has bruise L forehead. Family has not noticed any neurological deficits or change in mental status. On assessment by GI in the ED, FOBT was positive, and patient admitted for colonoscopy on  Monday.     In the ED, VS were T 97, HR 76, /50, RR 18, O2 99% on RA. Labs significant for Hb 7.1, WBC 11.3, 86% neutrophils, normal coag profile, BUN 40.  Given 650mg tylenol, 1L NS bolus, ordered for 1unit RBCs. (12 Apr 2018 18:31)    colonoscopy performed-see report  Allergies    alprazolam (Other (Unknown))  chocolate (Unknown)  iodine (Anaphylaxis)  levofloxacin (Unknown)  Nuts (Unknown)  penicillin (Unknown)  shellfish (Short breath)    Intolerances        acetaminophen   Tablet. 325 milliGRAM(s) Oral every 6 hours PRN  bisacodyl 5 milliGRAM(s) Oral at bedtime  losartan 25 milliGRAM(s) Oral daily  pantoprazole    Tablet 40 milliGRAM(s) Oral before breakfast  potassium chloride    Tablet ER 40 milliEquivalent(s) Oral once      Vital Signs Last 24 Hrs  T(C): 36.4 (20 Apr 2018 16:55), Max: 36.6 (19 Apr 2018 22:34)  T(F): 97.6 (20 Apr 2018 16:55), Max: 97.8 (19 Apr 2018 22:34)  HR: 84 (20 Apr 2018 16:55) (69 - 84)  BP: 124/77 (20 Apr 2018 16:55) (119/83 - 128/82)  BP(mean): 93 (20 Apr 2018 16:55) (93 - 93)  RR: 17 (20 Apr 2018 16:55) (16 - 18)  SpO2: 100% (20 Apr 2018 16:55) (97% - 100%)    PHYSICAL EXAM:      Constitutional:nad    Respiratory:cta    Cardiovascular:[id number]    Gastrointestinal:soft nt bs                              10.3   7.8   )-----------( 429      ( 20 Apr 2018 07:47 )             32.7       04-20    139  |  99  |  24<H>  ----------------------------<  87  3.5   |  28  |  0.68    Ca    9.0      20 Apr 2018 07:47  Phos  3.1     04-20  Mg     2.1     04-20        A/P  high fiber diet  monitor h/h  d/c to rehab

## 2018-04-20 NOTE — PROGRESS NOTE ADULT - ASSESSMENT
89F [facility] spinal stenosis, RA, HTN, Alzheimer's presented to [facility] from outpatient clinic with low hemoglobin, found to have Hb 7.1 with positive FOBT, scheduled for colonoscopy today.

## 2018-04-20 NOTE — PHYSICAL THERAPY INITIAL EVALUATION ADULT - GAIT DEVIATIONS NOTED, PT EVAL
decreased marlee/decreased step length/decreased weight-shifting ability/increased time in double stance

## 2018-04-20 NOTE — PROGRESS NOTE ADULT - PROBLEM SELECTOR PLAN 2
-c/w tylenol 325 mg q6h PRN for moderate pain  -does not follow with outpatient rheumatologist  -has taken Celebrex in past but per daughter, has not used in a long time, unable to specify when d/c-ed  -per Dr. Sow, may have been taking meloxicam at home  -new joint effusion R shoulder, f/u xray - No fracture or acute pathology.  -joint effusion, R knee. Ortho following. No intervention at this time.

## 2018-04-20 NOTE — PHYSICAL THERAPY INITIAL EVALUATION ADULT - CRITERIA FOR SKILLED THERAPEUTIC INTERVENTIONS
risk reduction/prevention/rehab potential/functional limitations in following categories/therapy frequency/impairments found/anticipated discharge recommendation

## 2018-04-20 NOTE — PROGRESS NOTE ADULT - SUBJECTIVE AND OBJECTIVE BOX
GERONIMO MACK    HPI:  89F [facility] spinal stenosis, RA, HTN, Alzheimer's presented to [facility] from outpatient clinic with low hemoglobin. History primarily obtained from patient's son in law and patient. Patient was at routine check up earlier today, found to have low hemoglobin with no signs of bleeding noted at home. She has been weak for the past day, however no blood noted in the stool, no darkened stools, no hematemesis/hemoptysis. Denies symptoms of dizziness, headache, SOB, chest pain, abdominal pain, N/V, fevers chills. Has not taken NSAIDs, not on any blood thinners at home. Takes tylenol for arthritic pain and spinal stenosis pain, which is currently not well controlled as she is laying in bed uncomfortably. Has never been transfused blood, never had blood in the stool. Decreased PO intake recently due to lack of sleep/ decreased taste sensation. Patient fell at home 8 days ago, has bruise L forehead. Family has not noticed any neurological deficits or change in mental status. On assessment by GI in the ED, FOBT was positive, and patient admitted for colonoscopy on  Monday.     In the ED, VS were T 97, HR 76, /50, RR 18, O2 99% on RA. Labs significant for Hb 7.1, WBC 11.3, 86% neutrophils, normal coag profile, BUN 40.  Given 650mg tylenol, 1L NS bolus, ordered for 1unit RBCs. (12 Apr 2018 18:31)    no acute events    Allergies    alprazolam (Other (Unknown))  chocolate (Unknown)  iodine (Anaphylaxis)  levofloxacin (Unknown)  Nuts (Unknown)  penicillin (Unknown)  shellfish (Short breath)    Intolerances        acetaminophen   Tablet. 325 milliGRAM(s) Oral every 6 hours PRN  bisacodyl 5 milliGRAM(s) Oral at bedtime  bisacodyl 5 milliGRAM(s) Oral once  pantoprazole    Tablet 40 milliGRAM(s) Oral before breakfast  polyethylene glycol/electrolyte Solution. 4000 milliLiter(s) Oral once      Vital Signs Last 24 Hrs  T(C): 36.5 (20 Apr 2018 06:35), Max: 36.6 (19 Apr 2018 16:30)  T(F): 97.7 (20 Apr 2018 06:35), Max: 97.8 (19 Apr 2018 16:30)  HR: 74 (20 Apr 2018 06:35) (69 - 74)  BP: 123/72 (20 Apr 2018 06:35) (114/71 - 155/81)  BP(mean): --  RR: 18 (20 Apr 2018 06:35) (17 - 18)  SpO2: 97% (20 Apr 2018 06:35) (97% - 100%)    PHYSICAL EXAM:      Constitutional:nad    Respiratory:cta    Cardiovascular:[id number]    Gastrointestinal:soft nt bs                            10.5   11.7  )-----------( 423      ( 19 Apr 2018 06:15 )             33.2       04-19    137  |  98  |  29<H>  ----------------------------<  113<H>  4.1   |  27  |  0.85    Ca    8.9      19 Apr 2018 06:15  Mg     2.2     04-19        A/P    for reattempt at colonoscopy today

## 2018-04-20 NOTE — PHYSICAL THERAPY INITIAL EVALUATION ADULT - ADDITIONAL COMMENTS
Unable to obtain social hx 2/2 pt not answering questions. Pt has straight cane in the corner of the room, 1:1 states that she has been transferring pt from bed to commode. As per progress notes pt sustained a fall prior to coming to the hospital

## 2018-04-20 NOTE — PHYSICAL THERAPY INITIAL EVALUATION ADULT - IMPAIRMENTS FOUND, PT EVAL
aerobic capacity/endurance/gross motor/cognitive impairment/muscle strength/poor safety awareness/gait, locomotion, and balance

## 2018-04-20 NOTE — PHYSICAL THERAPY INITIAL EVALUATION ADULT - PERTINENT HX OF CURRENT PROBLEM, REHAB EVAL
89F [facility] spinal stenosis, RA, HTN, Alzheimer's presented to [facility] from outpatient clinic with low hemoglobin, found to have Hb 7.1 with positive FOBT, scheduled for colonoscopy today.

## 2018-04-20 NOTE — PROGRESS NOTE ADULT - SUBJECTIVE AND OBJECTIVE BOX
SUBJECTIVE / INTERVAL HPI: Patient seen and examined at bedside.     VITAL SIGNS:  Vital Signs Last 24 Hrs  T(C): 36.6 (19 Apr 2018 22:34), Max: 36.6 (19 Apr 2018 16:30)  T(F): 97.8 (19 Apr 2018 22:34), Max: 97.8 (19 Apr 2018 16:30)  HR: 69 (19 Apr 2018 22:34) (69 - 73)  BP: 128/82 (19 Apr 2018 22:34) (114/71 - 155/81)  BP(mean): --  RR: 18 (19 Apr 2018 22:34) (17 - 18)  SpO2: 98% (19 Apr 2018 22:34) (98% - 100%)    PHYSICAL EXAM:    General: NAD, resting comfortably in bed  HEENT: NC/AT; PERRL, clear conjunctiva, EOMI, MMM  Neck: supple, no JVD or LAD  Respiratory: CTAB, no rales/rhonchi/crackles/wheezes  Cardiovascular: +S1/S2; RRR, no murmurs  Abdomen: soft, NT/ND; +BS x4  Extremities: WWP, 2+ peripheral pulses b/l; pitting edema calves. R shoulder with palpable effusion. L posterior shoulder tender to palpation.  Skin: normal color and turgor; no rash  Neurological: no gross focal neurological deficits.      MEDICATIONS:  MEDICATIONS  (STANDING):  bisacodyl 5 milliGRAM(s) Oral at bedtime  pantoprazole    Tablet 40 milliGRAM(s) Oral before breakfast  polyethylene glycol/electrolyte Solution. 4000 milliLiter(s) Oral once    MEDICATIONS  (PRN):  acetaminophen   Tablet. 325 milliGRAM(s) Oral every 6 hours PRN Mild Pain (1 - 3)      ALLERGIES:  Allergies    alprazolam (Other (Unknown))  chocolate (Unknown)  iodine (Anaphylaxis)  levofloxacin (Unknown)  Nuts (Unknown)  penicillin (Unknown)  shellfish (Short breath)    Intolerances        LABS:                        10.5   11.7  )-----------( 423      ( 19 Apr 2018 06:15 )             33.2     04-19    137  |  98  |  29<H>  ----------------------------<  113<H>  4.1   |  27  |  0.85    Ca    8.9      19 Apr 2018 06:15  Mg     2.2     04-19          CAPILLARY BLOOD GLUCOSE          RADIOLOGY & ADDITIONAL TESTS: Reviewed. SUBJECTIVE / INTERVAL HPI: Drank half of golytely last night. Unclear if stool is clear this morning. Patient seen and examined at bedside. No complaints. Feeling well.    VITAL SIGNS:  Vital Signs Last 24 Hrs  T(C): 36.6 (19 Apr 2018 22:34), Max: 36.6 (19 Apr 2018 16:30)  T(F): 97.8 (19 Apr 2018 22:34), Max: 97.8 (19 Apr 2018 16:30)  HR: 69 (19 Apr 2018 22:34) (69 - 73)  BP: 128/82 (19 Apr 2018 22:34) (114/71 - 155/81)  BP(mean): --  RR: 18 (19 Apr 2018 22:34) (17 - 18)  SpO2: 98% (19 Apr 2018 22:34) (98% - 100%)    PHYSICAL EXAM:    General: NAD, resting comfortably in bed  HEENT: NC/AT; PERRL, clear conjunctiva, EOMI, MMM  Neck: supple, no JVD or LAD  Respiratory: CTAB, no rales/rhonchi/crackles/wheezes  Cardiovascular: +S1/S2; RRR, no murmurs  Abdomen: soft, NT/ND; +BS x4  Extremities: WWP, 2+ peripheral pulses b/l; pitting edema calves. R shoulder with palpable effusion. L posterior shoulder tender to palpation.  Skin: normal color and turgor; no rash  Neurological: no gross focal neurological deficits.      MEDICATIONS:  MEDICATIONS  (STANDING):  bisacodyl 5 milliGRAM(s) Oral at bedtime  pantoprazole    Tablet 40 milliGRAM(s) Oral before breakfast  polyethylene glycol/electrolyte Solution. 4000 milliLiter(s) Oral once    MEDICATIONS  (PRN):  acetaminophen   Tablet. 325 milliGRAM(s) Oral every 6 hours PRN Mild Pain (1 - 3)      ALLERGIES:  Allergies    alprazolam (Other (Unknown))  chocolate (Unknown)  iodine (Anaphylaxis)  levofloxacin (Unknown)  Nuts (Unknown)  penicillin (Unknown)  shellfish (Short breath)    Intolerances        LABS:                        10.5   11.7  )-----------( 423      ( 19 Apr 2018 06:15 )             33.2     04-19    137  |  98  |  29<H>  ----------------------------<  113<H>  4.1   |  27  |  0.85    Ca    8.9      19 Apr 2018 06:15  Mg     2.2     04-19          CAPILLARY BLOOD GLUCOSE          RADIOLOGY & ADDITIONAL TESTS: Reviewed.

## 2018-04-20 NOTE — PROGRESS NOTE ADULT - PROBLEM SELECTOR PLAN 1
-Hb 7.1 on admission in setting of positive FOBT; evaluated by Dr. Quezada in ED who scheduled patient for colonoscopy on Monday however due to inadequate prep will be done Wednesday  -Golytely prep not being tolerated. Stool still not clear.   -protonix 40 PO daily per Dr. 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 -Hb 7.1 on admission in setting of positive FOBT; evaluated by Dr. Quezada in ED who scheduled patient for colonoscopy on Monday however due to inadequate prep will be done Wednesday. Again inadequate prep. Will be done Friday.  -protonix 40 PO daily per Dr. 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

## 2018-04-21 LAB
ANION GAP SERPL CALC-SCNC: 12 MMOL/L — SIGNIFICANT CHANGE UP (ref 5–17)
BUN SERPL-MCNC: 27 MG/DL — HIGH (ref 7–23)
CALCIUM SERPL-MCNC: 8.9 MG/DL — SIGNIFICANT CHANGE UP (ref 8.4–10.5)
CHLORIDE SERPL-SCNC: 102 MMOL/L — SIGNIFICANT CHANGE UP (ref 96–108)
CO2 SERPL-SCNC: 25 MMOL/L — SIGNIFICANT CHANGE UP (ref 22–31)
CREAT SERPL-MCNC: 0.71 MG/DL — SIGNIFICANT CHANGE UP (ref 0.5–1.3)
GLUCOSE SERPL-MCNC: 136 MG/DL — HIGH (ref 70–99)
HCT VFR BLD CALC: 30.5 % — LOW (ref 34.5–45)
HGB BLD-MCNC: 9.9 G/DL — LOW (ref 11.5–15.5)
MAGNESIUM SERPL-MCNC: 2.1 MG/DL — SIGNIFICANT CHANGE UP (ref 1.6–2.6)
MCHC RBC-ENTMCNC: 28.9 PG — SIGNIFICANT CHANGE UP (ref 27–34)
MCHC RBC-ENTMCNC: 32.5 G/DL — SIGNIFICANT CHANGE UP (ref 32–36)
MCV RBC AUTO: 89.2 FL — SIGNIFICANT CHANGE UP (ref 80–100)
PLATELET # BLD AUTO: 405 K/UL — HIGH (ref 150–400)
POTASSIUM SERPL-MCNC: 4.1 MMOL/L — SIGNIFICANT CHANGE UP (ref 3.5–5.3)
POTASSIUM SERPL-SCNC: 4.1 MMOL/L — SIGNIFICANT CHANGE UP (ref 3.5–5.3)
RBC # BLD: 3.42 M/UL — LOW (ref 3.8–5.2)
RBC # FLD: 14.5 % — SIGNIFICANT CHANGE UP (ref 10.3–16.9)
SODIUM SERPL-SCNC: 139 MMOL/L — SIGNIFICANT CHANGE UP (ref 135–145)
WBC # BLD: 8 K/UL — SIGNIFICANT CHANGE UP (ref 3.8–10.5)
WBC # FLD AUTO: 8 K/UL — SIGNIFICANT CHANGE UP (ref 3.8–10.5)

## 2018-04-21 RX ORDER — ACETAMINOPHEN 500 MG
650 TABLET ORAL EVERY 8 HOURS
Qty: 0 | Refills: 0 | Status: DISCONTINUED | OUTPATIENT
Start: 2018-04-21 | End: 2018-04-23

## 2018-04-21 RX ADMIN — Medication 650 MILLIGRAM(S): at 22:17

## 2018-04-21 RX ADMIN — Medication 325 MILLIGRAM(S): at 15:59

## 2018-04-21 RX ADMIN — Medication 325 MILLIGRAM(S): at 17:00

## 2018-04-21 RX ADMIN — PANTOPRAZOLE SODIUM 40 MILLIGRAM(S): 20 TABLET, DELAYED RELEASE ORAL at 06:12

## 2018-04-21 RX ADMIN — Medication 5 MILLIGRAM(S): at 22:17

## 2018-04-21 RX ADMIN — LOSARTAN POTASSIUM 25 MILLIGRAM(S): 100 TABLET, FILM COATED ORAL at 06:12

## 2018-04-21 NOTE — PROGRESS NOTE ADULT - PROBLEM SELECTOR PLAN 1
-Hb 7.1 on admission in setting of positive FOBT; evaluated by Dr. Quezada in ED who scheduled patient for colonoscopy on Monday however due to inadequate prep will be done Wednesday. Again inadequate prep. Will be done Friday.  -protonix 40 PO daily per Dr. 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 -Hb 7.1 on admission in setting of positive FOBT; evaluated by Dr. Quezada in ED who scheduled patient for colonoscopy on Monday however due to inadequate prep will be done Wednesday. Again inadequate prep. Will be done Friday.  -protonix 40 PO daily per Dr. 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  - Hg stable.

## 2018-04-21 NOTE — PROGRESS NOTE ADULT - SUBJECTIVE AND OBJECTIVE BOX
GERONIMO MACK    HPI:  89F [facility] spinal stenosis, RA, HTN, Alzheimer's presented to [facility] from outpatient clinic with low hemoglobin. History primarily obtained from patient's son in law and patient. Patient was at routine check up earlier today, found to have low hemoglobin with no signs of bleeding noted at home. She has been weak for the past day, however no blood noted in the stool, no darkened stools, no hematemesis/hemoptysis. Denies symptoms of dizziness, headache, SOB, chest pain, abdominal pain, N/V, fevers chills. Has not taken NSAIDs, not on any blood thinners at home. Takes tylenol for arthritic pain and spinal stenosis pain, which is currently not well controlled as she is laying in bed uncomfortably. Has never been transfused blood, never had blood in the stool. Decreased PO intake recently due to lack of sleep/ decreased taste sensation. Patient fell at home 8 days ago, has bruise L forehead. Family has not noticed any neurological deficits or change in mental status. On assessment by GI in the ED, FOBT was positive, and patient admitted for colonoscopy on  Monday.     In the ED, VS were T 97, HR 76, /50, RR 18, O2 99% on RA. Labs significant for Hb 7.1, WBC 11.3, 86% neutrophils, normal coag profile, BUN 40.  Given 650mg tylenol, 1L NS bolus, ordered for 1unit RBCs. (12 Apr 2018 18:31)    no acute events    Allergies    alprazolam (Other (Unknown))  chocolate (Unknown)  iodine (Anaphylaxis)  levofloxacin (Unknown)  Nuts (Unknown)  penicillin (Unknown)  shellfish (Short breath)    Intolerances        acetaminophen   Tablet. 325 milliGRAM(s) Oral every 6 hours PRN  bisacodyl 5 milliGRAM(s) Oral at bedtime  losartan 25 milliGRAM(s) Oral daily  pantoprazole    Tablet 40 milliGRAM(s) Oral before breakfast      Vital Signs Last 24 Hrs  T(C): 36.9 (21 Apr 2018 05:18), Max: 36.9 (21 Apr 2018 05:18)  T(F): 98.5 (21 Apr 2018 05:18), Max: 98.5 (21 Apr 2018 05:18)  HR: 72 (21 Apr 2018 05:18) (66 - 84)  BP: 110/68 (21 Apr 2018 05:18) (110/68 - 138/72)  BP(mean): 93 (20 Apr 2018 16:55) (93 - 93)  RR: 16 (21 Apr 2018 05:18) (14 - 17)  SpO2: 99% (21 Apr 2018 05:18) (99% - 100%)    PHYSICAL EXAM:      Constitutional:nad    Respiratory:cta    Cardiovascular:[id number]    Gastrointestinal:soft nt bs                                10.3   7.8   )-----------( 429      ( 20 Apr 2018 07:47 )             32.7       04-20    139  |  99  |  24<H>  ----------------------------<  87  3.5   |  28  |  0.68    Ca    9.0      20 Apr 2018 07:47  Phos  3.1     04-20  Mg     2.1     04-20        A/P  h/h stable  resume diet and meds  d/c planning

## 2018-04-21 NOTE — PROGRESS NOTE ADULT - PROBLEM SELECTOR PLAN 5
-clear liquid diet; NPO after 12AM tonight  -Replete lytes PRN  -No IVFs  -no HSQ in setting of bleed    Dispo: [facility] -clear liquid diet; NPO after 12AM tonight  -Replete lytes PRN  -No IVFs  -no HSQ in setting of bleed    Dispo: Pending [facility]

## 2018-04-21 NOTE — PROGRESS NOTE ADULT - SUBJECTIVE AND OBJECTIVE BOX
SUBJECTIVE / INTERVAL HPI: C-scope yesterday showed hemorrhoids and diverticulosis. Patient seen and examined at bedside.     VITAL SIGNS:  Vital Signs Last 24 Hrs  T(C): 36.9 (21 Apr 2018 05:18), Max: 36.9 (21 Apr 2018 05:18)  T(F): 98.5 (21 Apr 2018 05:18), Max: 98.5 (21 Apr 2018 05:18)  HR: 72 (21 Apr 2018 05:18) (66 - 84)  BP: 110/68 (21 Apr 2018 05:18) (110/68 - 138/72)  BP(mean): 93 (20 Apr 2018 16:55) (93 - 93)  RR: 16 (21 Apr 2018 05:18) (14 - 17)  SpO2: 99% (21 Apr 2018 05:18) (99% - 100%)    PHYSICAL EXAM:    General: NAD, resting comfortably in bed  HEENT: NC/AT; PERRL, clear conjunctiva, EOMI, MMM  Neck: supple, no JVD or LAD  Respiratory: CTAB, no rales/rhonchi/crackles/wheezes  Cardiovascular: +S1/S2; RRR, no murmurs  Abdomen: soft, NT/ND; +BS x4  Extremities: WWP, 2+ peripheral pulses b/l; pitting edema calves. R shoulder with palpable effusion. L posterior shoulder tender to palpation.  Skin: normal color and turgor; no rash  Neurological: no gross focal neurological deficits.      MEDICATIONS:  MEDICATIONS  (STANDING):  bisacodyl 5 milliGRAM(s) Oral at bedtime  losartan 25 milliGRAM(s) Oral daily  pantoprazole    Tablet 40 milliGRAM(s) Oral before breakfast    MEDICATIONS  (PRN):  acetaminophen   Tablet. 325 milliGRAM(s) Oral every 6 hours PRN Mild Pain (1 - 3)      ALLERGIES:  Allergies    alprazolam (Other (Unknown))  chocolate (Unknown)  iodine (Anaphylaxis)  levofloxacin (Unknown)  Nuts (Unknown)  penicillin (Unknown)  shellfish (Short breath)    Intolerances        LABS:                        10.3   7.8   )-----------( 429      ( 20 Apr 2018 07:47 )             32.7     04-20    139  |  99  |  24<H>  ----------------------------<  87  3.5   |  28  |  0.68    Ca    9.0      20 Apr 2018 07:47  Phos  3.1     04-20  Mg     2.1     04-20          CAPILLARY BLOOD GLUCOSE          RADIOLOGY & ADDITIONAL TESTS: Reviewed. SUBJECTIVE / INTERVAL HPI: C-scope yesterday showed hemorrhoids and diverticulosis. Patient seen and examined at bedside. No complaints this morning. Doing well.    VITAL SIGNS:  Vital Signs Last 24 Hrs  T(C): 36.9 (21 Apr 2018 05:18), Max: 36.9 (21 Apr 2018 05:18)  T(F): 98.5 (21 Apr 2018 05:18), Max: 98.5 (21 Apr 2018 05:18)  HR: 72 (21 Apr 2018 05:18) (66 - 84)  BP: 110/68 (21 Apr 2018 05:18) (110/68 - 138/72)  BP(mean): 93 (20 Apr 2018 16:55) (93 - 93)  RR: 16 (21 Apr 2018 05:18) (14 - 17)  SpO2: 99% (21 Apr 2018 05:18) (99% - 100%)    PHYSICAL EXAM:    General: NAD, resting comfortably in bed  HEENT: NC/AT; PERRL, clear conjunctiva, EOMI, MMM  Neck: supple, no JVD or LAD  Respiratory: CTAB, no rales/rhonchi/crackles/wheezes  Cardiovascular: +S1/S2; RRR, no murmurs  Abdomen: soft, NT/ND; +BS x4  Extremities: WWP, 2+ peripheral pulses b/l; pitting edema calves. R shoulder with palpable effusion. L posterior shoulder tender to palpation.  Skin: normal color and turgor; no rash  Neurological: no gross focal neurological deficits.      MEDICATIONS:  MEDICATIONS  (STANDING):  bisacodyl 5 milliGRAM(s) Oral at bedtime  losartan 25 milliGRAM(s) Oral daily  pantoprazole    Tablet 40 milliGRAM(s) Oral before breakfast    MEDICATIONS  (PRN):  acetaminophen   Tablet. 325 milliGRAM(s) Oral every 6 hours PRN Mild Pain (1 - 3)      ALLERGIES:  Allergies    alprazolam (Other (Unknown))  chocolate (Unknown)  iodine (Anaphylaxis)  levofloxacin (Unknown)  Nuts (Unknown)  penicillin (Unknown)  shellfish (Short breath)    Intolerances        LABS:                        10.3   7.8   )-----------( 429      ( 20 Apr 2018 07:47 )             32.7     04-20    139  |  99  |  24<H>  ----------------------------<  87  3.5   |  28  |  0.68    Ca    9.0      20 Apr 2018 07:47  Phos  3.1     04-20  Mg     2.1     04-20          CAPILLARY BLOOD GLUCOSE          RADIOLOGY & ADDITIONAL TESTS: Reviewed.

## 2018-04-21 NOTE — PROGRESS NOTE ADULT - ASSESSMENT
89F [facility] spinal stenosis, RA, HTN, Alzheimer's presented to [facility] from outpatient clinic with low hemoglobin, found to have Hb 7.1 with positive FOBT, scheduled for colonoscopy today.

## 2018-04-22 LAB
CULTURE RESULTS: SIGNIFICANT CHANGE UP
CULTURE RESULTS: SIGNIFICANT CHANGE UP
SPECIMEN SOURCE: SIGNIFICANT CHANGE UP
SPECIMEN SOURCE: SIGNIFICANT CHANGE UP

## 2018-04-22 RX ORDER — HEPARIN SODIUM 5000 [USP'U]/ML
5000 INJECTION INTRAVENOUS; SUBCUTANEOUS EVERY 12 HOURS
Qty: 0 | Refills: 0 | Status: DISCONTINUED | OUTPATIENT
Start: 2018-04-22 | End: 2018-04-23

## 2018-04-22 RX ADMIN — Medication 650 MILLIGRAM(S): at 22:09

## 2018-04-22 RX ADMIN — Medication 650 MILLIGRAM(S): at 05:40

## 2018-04-22 RX ADMIN — LOSARTAN POTASSIUM 25 MILLIGRAM(S): 100 TABLET, FILM COATED ORAL at 05:40

## 2018-04-22 RX ADMIN — Medication 5 MILLIGRAM(S): at 22:09

## 2018-04-22 RX ADMIN — PANTOPRAZOLE SODIUM 40 MILLIGRAM(S): 20 TABLET, DELAYED RELEASE ORAL at 05:41

## 2018-04-22 RX ADMIN — HEPARIN SODIUM 5000 UNIT(S): 5000 INJECTION INTRAVENOUS; SUBCUTANEOUS at 17:56

## 2018-04-22 RX ADMIN — Medication 650 MILLIGRAM(S): at 06:19

## 2018-04-22 RX ADMIN — Medication 650 MILLIGRAM(S): at 22:08

## 2018-04-23 VITALS
TEMPERATURE: 98 F | HEART RATE: 67 BPM | OXYGEN SATURATION: 98 % | SYSTOLIC BLOOD PRESSURE: 109 MMHG | DIASTOLIC BLOOD PRESSURE: 69 MMHG | RESPIRATION RATE: 16 BRPM

## 2018-04-23 LAB
HCT VFR BLD CALC: 29.8 % — LOW (ref 34.5–45)
HGB BLD-MCNC: 9.1 G/DL — LOW (ref 11.5–15.5)
MCHC RBC-ENTMCNC: 27.9 PG — SIGNIFICANT CHANGE UP (ref 27–34)
MCHC RBC-ENTMCNC: 30.5 G/DL — LOW (ref 32–36)
MCV RBC AUTO: 91.4 FL — SIGNIFICANT CHANGE UP (ref 80–100)
PLATELET # BLD AUTO: 387 K/UL — SIGNIFICANT CHANGE UP (ref 150–400)
RBC # BLD: 3.26 M/UL — LOW (ref 3.8–5.2)
RBC # FLD: 14.2 % — SIGNIFICANT CHANGE UP (ref 10.3–16.9)
WBC # BLD: 6.1 K/UL — SIGNIFICANT CHANGE UP (ref 3.8–10.5)
WBC # FLD AUTO: 6.1 K/UL — SIGNIFICANT CHANGE UP (ref 3.8–10.5)

## 2018-04-23 RX ADMIN — LOSARTAN POTASSIUM 25 MILLIGRAM(S): 100 TABLET, FILM COATED ORAL at 06:11

## 2018-04-23 RX ADMIN — HEPARIN SODIUM 5000 UNIT(S): 5000 INJECTION INTRAVENOUS; SUBCUTANEOUS at 06:11

## 2018-04-23 RX ADMIN — Medication 650 MILLIGRAM(S): at 06:11

## 2018-04-23 RX ADMIN — Medication 650 MILLIGRAM(S): at 15:38

## 2018-04-23 RX ADMIN — PANTOPRAZOLE SODIUM 40 MILLIGRAM(S): 20 TABLET, DELAYED RELEASE ORAL at 06:12

## 2018-04-23 RX ADMIN — Medication 650 MILLIGRAM(S): at 06:12

## 2018-04-23 NOTE — PROGRESS NOTE ADULT - PROVIDER SPECIALTY LIST ADULT
Gastroenterology
Internal Medicine
Gastroenterology
Rehab Medicine
Internal Medicine

## 2018-04-23 NOTE — PROGRESS NOTE ADULT - SUBJECTIVE AND OBJECTIVE BOX
SUBJECTIVE / INTERVAL HPI: No major events overnight. Patient seen and examined at bedside.     VITAL SIGNS:  Vital Signs Last 24 Hrs  T(C): 36.5 (22 Apr 2018 20:52), Max: 36.6 (22 Apr 2018 09:05)  T(F): 97.7 (22 Apr 2018 20:52), Max: 97.9 (22 Apr 2018 09:05)  HR: 84 (22 Apr 2018 20:52) (69 - 84)  BP: 120/66 (22 Apr 2018 20:52) (107/64 - 132/76)  BP(mean): 78 (22 Apr 2018 16:26) (78 - 78)  RR: 19 (22 Apr 2018 20:52) (18 - 19)  SpO2: 100% (22 Apr 2018 20:52) (96% - 100%)    PHYSICAL EXAM:    General: NAD, resting comfortably in bed  HEENT: NC/AT; PERRL, clear conjunctiva, EOMI, MMM  Neck: supple, no JVD or LAD  Respiratory: CTAB, no rales/rhonchi/crackles/wheezes  Cardiovascular: +S1/S2; RRR, no murmurs  Abdomen: soft, NT/ND; +BS x4  Extremities: WWP, 2+ peripheral pulses b/l; pitting edema calves. R shoulder with palpable effusion. L posterior shoulder tender to palpation.  Skin: normal color and turgor; no rash  Neurological: no gross focal neurological deficits.      MEDICATIONS:  MEDICATIONS  (STANDING):  acetaminophen   Tablet. 650 milliGRAM(s) Oral every 8 hours  bisacodyl 5 milliGRAM(s) Oral at bedtime  heparin  Injectable 5000 Unit(s) SubCutaneous every 12 hours  losartan 25 milliGRAM(s) Oral daily  pantoprazole    Tablet 40 milliGRAM(s) Oral before breakfast    MEDICATIONS  (PRN):      ALLERGIES:  Allergies    alprazolam (Other (Unknown))  chocolate (Unknown)  iodine (Anaphylaxis)  levofloxacin (Unknown)  Nuts (Unknown)  penicillin (Unknown)  shellfish (Short breath)    Intolerances        LABS:                        9.1    6.1   )-----------( 387      ( 23 Apr 2018 06:04 )             29.8     04-21    139  |  102  |  27<H>  ----------------------------<  136<H>  4.1   |  25  |  0.71    Ca    8.9      21 Apr 2018 09:10  Mg     2.1     04-21          CAPILLARY BLOOD GLUCOSE          RADIOLOGY & ADDITIONAL TESTS: Reviewed. SUBJECTIVE / INTERVAL HPI: No major events overnight. Patient seen and examined at bedside. No complaints this morning.    VITAL SIGNS:  Vital Signs Last 24 Hrs  T(C): 36.5 (22 Apr 2018 20:52), Max: 36.6 (22 Apr 2018 09:05)  T(F): 97.7 (22 Apr 2018 20:52), Max: 97.9 (22 Apr 2018 09:05)  HR: 84 (22 Apr 2018 20:52) (69 - 84)  BP: 120/66 (22 Apr 2018 20:52) (107/64 - 132/76)  BP(mean): 78 (22 Apr 2018 16:26) (78 - 78)  RR: 19 (22 Apr 2018 20:52) (18 - 19)  SpO2: 100% (22 Apr 2018 20:52) (96% - 100%)    PHYSICAL EXAM:    General: NAD, resting comfortably in bed  HEENT: NC/AT; PERRL, clear conjunctiva, EOMI, MMM  Neck: supple, no JVD or LAD  Respiratory: CTAB, no rales/rhonchi/crackles/wheezes  Cardiovascular: +S1/S2; RRR, no murmurs  Abdomen: soft, NT/ND; +BS x4  Extremities: WWP, 2+ peripheral pulses b/l; pitting edema calves. R shoulder with palpable effusion. L posterior shoulder tender to palpation.  Skin: normal color and turgor; no rash  Neurological: no gross focal neurological deficits.      MEDICATIONS:  MEDICATIONS  (STANDING):  acetaminophen   Tablet. 650 milliGRAM(s) Oral every 8 hours  bisacodyl 5 milliGRAM(s) Oral at bedtime  heparin  Injectable 5000 Unit(s) SubCutaneous every 12 hours  losartan 25 milliGRAM(s) Oral daily  pantoprazole    Tablet 40 milliGRAM(s) Oral before breakfast    MEDICATIONS  (PRN):      ALLERGIES:  Allergies    alprazolam (Other (Unknown))  chocolate (Unknown)  iodine (Anaphylaxis)  levofloxacin (Unknown)  Nuts (Unknown)  penicillin (Unknown)  shellfish (Short breath)    Intolerances        LABS:                        9.1    6.1   )-----------( 387      ( 23 Apr 2018 06:04 )             29.8     04-21    139  |  102  |  27<H>  ----------------------------<  136<H>  4.1   |  25  |  0.71    Ca    8.9      21 Apr 2018 09:10  Mg     2.1     04-21          CAPILLARY BLOOD GLUCOSE          RADIOLOGY & ADDITIONAL TESTS: Reviewed.

## 2018-04-23 NOTE — PROGRESS NOTE ADULT - ASSESSMENT
89F [facility] spinal stenosis, RA, HTN, Alzheimer's presented to [facility] from outpatient clinic with low hemoglobin, found to have Hb 7.1 with positive FOBT, scheduled for colonoscopy today.

## 2018-04-23 NOTE — PROGRESS NOTE ADULT - SUBJECTIVE AND OBJECTIVE BOX
Physical Medicine and Rehabilitation Progress Note:    Patient is a 89y old  Female who presents with a chief complaint of Low Hemoglobin (17 Apr 2018 02:24)      HPI:  89F [facility] spinal stenosis, RA, HTN, Alzheimer's presented to [facility] from outpatient clinic with low hemoglobin. History primarily obtained from patient's son in law and patient. Patient was at routine check up earlier today, found to have low hemoglobin with no signs of bleeding noted at home. She has been weak for the past day, however no blood noted in the stool, no darkened stools, no hematemesis/hemoptysis. Denies symptoms of dizziness, headache, SOB, chest pain, abdominal pain, N/V, fevers chills. Has not taken NSAIDs, not on any blood thinners at home. Takes tylenol for arthritic pain and spinal stenosis pain, which is currently not well controlled as she is laying in bed uncomfortably. Has never been transfused blood, never had blood in the stool. Decreased PO intake recently due to lack of sleep/ decreased taste sensation. Patient fell at home 8 days ago, has bruise L forehead. Family has not noticed any neurological deficits or change in mental status. On assessment by GI in the ED, FOBT was positive, and patient admitted for colonoscopy on  Monday.     In the ED, VS were T 97, HR 76, /50, RR 18, O2 99% on RA. Labs significant for Hb 7.1, WBC 11.3, 86% neutrophils, normal coag profile, BUN 40.  Given 650mg tylenol, 1L NS bolus, ordered for 1unit RBCs. (12 Apr 2018 18:31)                            9.1    6.1   )-----------( 387      ( 23 Apr 2018 06:04 )             29.8             Vital Signs Last 24 Hrs  T(C): 36.8 (23 Apr 2018 12:00), Max: 36.8 (23 Apr 2018 12:00)  T(F): 98.2 (23 Apr 2018 12:00), Max: 98.2 (23 Apr 2018 12:00)  HR: 80 (23 Apr 2018 12:00) (77 - 84)  BP: 158/65 (23 Apr 2018 12:00) (107/64 - 158/65)  BP(mean): 78 (22 Apr 2018 16:26) (78 - 78)  RR: 14 (23 Apr 2018 12:00) (14 - 19)  SpO2: 98% (23 Apr 2018 12:00) (98% - 100%)    MEDICATIONS  (STANDING):  acetaminophen   Tablet. 650 milliGRAM(s) Oral every 8 hours  bisacodyl 5 milliGRAM(s) Oral at bedtime  heparin  Injectable 5000 Unit(s) SubCutaneous every 12 hours  losartan 25 milliGRAM(s) Oral daily  pantoprazole    Tablet 40 milliGRAM(s) Oral before breakfast    MEDICATIONS  (PRN):    Currently Undergoing Physical Therapy at bedside.    Functional Status Assessment:    Previous Level of Function:     · Additional Comments	Unable to obtain social hx 2/2 pt not answering questions. Pt has straight cane in the corner of the room, 1:1 states that she has been transferring pt from bed to commode. As per progress notes pt sustained a fall prior to coming to the hospital	    Cognitive Status Examination:   · Orientation	person	  · Level of Consciousness	alert; confused	  · Follows Commands and Answers Questions	75% of the time	  · Personal Safety and Judgment	impaired	    Peripheral Neurovascular:     Neurovascular Assessment:   · LLE	warm; no discoloration; no tingling; no numbness	  · RLE	warm; no discoloration; no numbness; no tingling	    Range of Motion Exam:   · Range of Motion Examination	no ROM deficits were identified	    Manual Muscle Testing:   · Manual Muscle Testing Results	grossly at least 3+/5 2/2 ability to perform functional mob against gravity	    Bed Mobility: Rolling/Turning:     · Level of [place]	minimum assist (75% patients effort)	  · Physical Assist/Nonphysical Assist	1 person assist; nonverbal cues (demo/gestures); verbal cues	    Bed Mobility: Sit to Supine:     · Level of [place]	minimum assist (75% patients effort)	  · Physical Assist/Nonphysical Assist	1 person assist; nonverbal cues (demo/gestures); verbal cues	    Bed Mobility: Supine to Sit:     · Level of [place]	minimum assist (75% patients effort)	  · Physical Assist/Nonphysical Assist	1 person assist; nonverbal cues (demo/gestures); verbal cues	    Bed Mobility Analysis:     · Bed Mobility Limitations	decreased ability to use arms for pushing/pulling; decreased ability to use legs for bridging/pushing; impaired ability to control trunk for mobility	  · Impairments Contributing to Impaired Bed Mobility	impaired balance; narrow base of support; pain; decreased strength; cognition	    Transfer: Sit to Stand:     · Level of [place]	moderate assist (50% patients effort)	  · Physical Assist/Nonphysical Assist	1 person assist; verbal cues; nonverbal cues (demo/gestures)	  · Assistive Device	HHA	    Transfer: Stand to Sit:     · Level of [place]	minimum assist (75% patients effort)	  · Physical Assist/Nonphysical Assist	1 person assist; nonverbal cues (demo/gestures); verbal cues	  · Assistive Device	HHA	    Sit/Stand Transfer Safety Analysis:     · Transfer Safety Concerns Noted	decreased balance during turns; decreased safety awareness; losing balance; decreased step length; decreased weight-shifting ability	  · Impairments Contributing to Impaired Transfers	impaired balance; cognition; narrow base of support; pain; decreased strength	    Gait Skills:     · Level of [place]	moderate assist (50% patients effort)	  · Physical Assist/Nonphysical Assist	1 person assist; nonverbal cues (demo/gestures); verbal cues	  · Assistive Device	HHA	  · Gait Distance	3 side steps along EOB	    Gait Analysis:     · Gait Pattern Used	2-point gait	  · Gait Deviations Noted	decreased marlee; increased time in double stance; decreased step length; decreased weight-shifting ability	  · Impairments Contributing to Gait Deviations	impaired balance; cognition; narrow base of support; pain; decreased strength	    Stair Negotiation:     · Level of [place]	NA	    Balance Skills Assessment:     · Sitting Balance: Static	good balance	  · Sitting Balance: Dynamic	good balance	  · Sit-to-Stand Balance	poor balance	  · Standing Balance: Static	fair balance	  · Standing Balance: Dynamic	fair balance	  · Systems Impairment Contributing to Balance Disturbance	musculoskeletal; cognitive	  · Identified Impairments Contributing to Balance Disturbance	pain; decreased strength	    Clinical Impressions:   · Criteria for Skilled Therapeutic Interventions	impairments found; functional limitations in following categories; risk reduction/prevention; rehab potential; therapy frequency; anticipated discharge recommendation	  · Impairments Found (describe specific impairments)	aerobic capacity/endurance; cognitive impairment; gait, locomotion, and balance; gross motor; muscle strength; poor safety awareness	  · Functional Limitations in Following Categories (describe specific limitations)	self-care; home management; community/leisure	  · Risk Reduction/Prevention (Describe Specific Areas of risk reduction/prevention)	risk factors	  · Risk Areas	fall	  · Rehab Potential	fair, will monitor progress closely	  · Therapy Frequency	2-3x/week	      PM&R Impression: as above    Disposition Plan Recommendations: subacute rehab placement

## 2018-04-23 NOTE — PROGRESS NOTE ADULT - SUBJECTIVE AND OBJECTIVE BOX
GERONIMO MACK    HPI:  89F [facility] spinal stenosis, RA, HTN, Alzheimer's presented to [facility] from outpatient clinic with low hemoglobin. History primarily obtained from patient's son in law and patient. Patient was at routine check up earlier today, found to have low hemoglobin with no signs of bleeding noted at home. She has been weak for the past day, however no blood noted in the stool, no darkened stools, no hematemesis/hemoptysis. Denies symptoms of dizziness, headache, SOB, chest pain, abdominal pain, N/V, fevers chills. Has not taken NSAIDs, not on any blood thinners at home. Takes tylenol for arthritic pain and spinal stenosis pain, which is currently not well controlled as she is laying in bed uncomfortably. Has never been transfused blood, never had blood in the stool. Decreased PO intake recently due to lack of sleep/ decreased taste sensation. Patient fell at home 8 days ago, has bruise L forehead. Family has not noticed any neurological deficits or change in mental status. On assessment by GI in the ED, FOBT was positive, and patient admitted for colonoscopy on  Monday.     In the ED, VS were T 97, HR 76, /50, RR 18, O2 99% on RA. Labs significant for Hb 7.1, WBC 11.3, 86% neutrophils, normal coag profile, BUN 40.  Given 650mg tylenol, 1L NS bolus, ordered for 1unit RBCs. (12 Apr 2018 18:31)    no acute events    Allergies    alprazolam (Other (Unknown))  chocolate (Unknown)  iodine (Anaphylaxis)  levofloxacin (Unknown)  Nuts (Unknown)  penicillin (Unknown)  shellfish (Short breath)    Intolerances        acetaminophen   Tablet. 650 milliGRAM(s) Oral every 8 hours  bisacodyl 5 milliGRAM(s) Oral at bedtime  heparin  Injectable 5000 Unit(s) SubCutaneous every 12 hours  losartan 25 milliGRAM(s) Oral daily  pantoprazole    Tablet 40 milliGRAM(s) Oral before breakfast      Vital Signs Last 24 Hrs  T(C): 36.5 (22 Apr 2018 20:52), Max: 36.6 (22 Apr 2018 09:05)  T(F): 97.7 (22 Apr 2018 20:52), Max: 97.9 (22 Apr 2018 09:05)  HR: 84 (22 Apr 2018 20:52) (69 - 84)  BP: 120/66 (22 Apr 2018 20:52) (107/64 - 132/76)  BP(mean): 78 (22 Apr 2018 16:26) (78 - 78)  RR: 19 (22 Apr 2018 20:52) (18 - 19)  SpO2: 100% (22 Apr 2018 20:52) (96% - 100%)    PHYSICAL EXAM:      Constitutional:nad    Respiratory:cta    Cardiovascular:[id number]    Gastrointestinal:soft ntbs                              9.9    8.0   )-----------( 405      ( 21 Apr 2018 09:10 )             30.5       04-21    139  |  102  |  27<H>  ----------------------------<  136<H>  4.1   |  25  |  0.71    Ca    8.9      21 Apr 2018 09:10  Mg     2.1     04-21        A/P    monitor h/h  d/c planning  ppi daily

## 2018-04-23 NOTE — PROGRESS NOTE ADULT - ASSESSMENT
89F [facility] spinal stenosis, RA, HTN, Alzheimer's presented to [facility] from outpatient clinic with low hemoglobin, found to have Hb 7.1 with positive FOBT, scheduled for colonoscopy today.     Problem/Plan - 1:  ·  Problem: Anemia.  Plan: -Hb 7.1 on admission in setting of positive FOBT; evaluated by Dr. Quezada in ED who scheduled patient for colonoscopy on Monday however due to inadequate prep will be done Wednesday. Again inadequate prep. Will be done Friday.  -protonix 40 PO daily per Dr. 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  - Hg with slight downtrend.    Problem/Plan - 2:  ·  Problem: Rheumatoid arthritis.  Plan: -c/w tylenol 325 mg q6h PRN for moderate pain  -does not follow with outpatient rheumatologist  -has taken Celebrex in past but per daughter, has not used in a long time, unable to specify when d/c-ed  -per Dr. Sow, may have been taking meloxicam at home  -new joint effusion R shoulder, f/u xray - No fracture or acute pathology.  -joint effusion, R knee. Ortho following. No intervention at this time.     Problem/Plan - 3:  ·  Problem: Spinal stenosis.  Plan: -c/w tylenol 325 mg q6h PRN for moderate pain, mostly in lower back sometimes radiates to calves  -pain is more tolerable when patient is sitting up in chair.

## 2018-04-23 NOTE — PROGRESS NOTE ADULT - PROBLEM SELECTOR PLAN 5
-clear liquid diet; NPO after 12AM tonight  -Replete lytes PRN  -No IVFs  -no HSQ in setting of bleed    Dispo: Pending [facility]

## 2018-04-23 NOTE — PROGRESS NOTE ADULT - PROBLEM SELECTOR PROBLEM 3
Spinal stenosis

## 2018-04-23 NOTE — PROGRESS NOTE ADULT - PROBLEM SELECTOR PLAN 1
-Hb 7.1 on admission in setting of positive FOBT; evaluated by Dr. Quezada in ED who scheduled patient for colonoscopy on Monday however due to inadequate prep will be done Wednesday. Again inadequate prep. Will be done Friday.  -protonix 40 PO daily per Dr. 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  - Hg stable. -Hb 7.1 on admission in setting of positive FOBT; evaluated by Dr. Quezada in ED who scheduled patient for colonoscopy on Monday however due to inadequate prep will be done Wednesday. Again inadequate prep. Will be done Friday.  -protonix 40 PO daily per Dr. 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  - Hg with slight downtrend.

## 2018-04-23 NOTE — PROGRESS NOTE ADULT - PROBLEM SELECTOR PLAN 4
-hold home losartan 25mg in setting of bleed -held home losartan 25mg in setting of bleed  - Losartan restarted.

## 2018-04-27 DIAGNOSIS — S50.12XA CONTUSION OF LEFT FOREARM, INITIAL ENCOUNTER: ICD-10-CM

## 2018-04-27 DIAGNOSIS — K64.8 OTHER HEMORRHOIDS: ICD-10-CM

## 2018-04-27 DIAGNOSIS — Z71.3 DIETARY COUNSELING AND SURVEILLANCE: ICD-10-CM
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
## 2018-04-27 DIAGNOSIS — Z91.81 HISTORY OF FALLING: ICD-10-CM

## 2018-04-27 DIAGNOSIS — D62 ACUTE POSTHEMORRHAGIC ANEMIA: ICD-10-CM

## 2018-04-27 DIAGNOSIS — D64.9 ANEMIA, UNSPECIFIED: ICD-10-CM

## 2018-04-27 DIAGNOSIS — Z91.018 ALLERGY TO OTHER FOODS: ICD-10-CM

## 2018-04-27 DIAGNOSIS — Z91.041 RADIOGRAPHIC DYE ALLERGY STATUS: ICD-10-CM

## 2018-04-27 DIAGNOSIS — Z91.013 ALLERGY TO SEAFOOD: ICD-10-CM

## 2018-04-27 DIAGNOSIS — M48.00 SPINAL STENOSIS, SITE UNSPECIFIED: ICD-10-CM

## 2018-04-27 DIAGNOSIS — T43.591A POISONING BY OTHER ANTIPSYCHOTICS AND NEUROLEPTICS, ACCIDENTAL (UNINTENTIONAL), INITIAL ENCOUNTER: ICD-10-CM

## 2018-04-27 DIAGNOSIS — F02.81 DEMENTIA IN OTHER DISEASES CLASSIFIED ELSEWHERE, UNSPECIFIED SEVERITY, WITH BEHAVIORAL DISTURBANCE: ICD-10-CM

## 2018-04-27 DIAGNOSIS — K92.2 GASTROINTESTINAL HEMORRHAGE, UNSPECIFIED: ICD-10-CM

## 2018-04-27 DIAGNOSIS — Z88.9 ALLERGY STATUS TO UNSPECIFIED DRUGS, MEDICAMENTS AND BIOLOGICAL SUBSTANCES: ICD-10-CM

## 2018-04-27 DIAGNOSIS — K31.9 DISEASE OF STOMACH AND DUODENUM, UNSPECIFIED: ICD-10-CM

## 2018-04-27 DIAGNOSIS — E44.0 MODERATE PROTEIN-CALORIE MALNUTRITION: ICD-10-CM

## 2018-04-27 DIAGNOSIS — F05 DELIRIUM DUE TO KNOWN PHYSIOLOGICAL CONDITION: ICD-10-CM

## 2018-04-27 DIAGNOSIS — M06.9 RHEUMATOID ARTHRITIS, UNSPECIFIED: ICD-10-CM

## 2018-04-27 DIAGNOSIS — G30.9 ALZHEIMER'S DISEASE, UNSPECIFIED: ICD-10-CM

## 2018-04-27 DIAGNOSIS — I10 ESSENTIAL (PRIMARY) HYPERTENSION: ICD-10-CM

## 2018-04-27 DIAGNOSIS — F50.9 EATING DISORDER, UNSPECIFIED: ICD-10-CM

## 2018-04-27 DIAGNOSIS — Z66 DO NOT RESUSCITATE: ICD-10-CM

## 2018-04-27 DIAGNOSIS — G92 TOXIC ENCEPHALOPATHY: ICD-10-CM
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
## 2018-04-27 DIAGNOSIS — Z88.0 ALLERGY STATUS TO PENICILLIN: ICD-10-CM

## 2018-04-27 DIAGNOSIS — K44.9 DIAPHRAGMATIC HERNIA WITHOUT OBSTRUCTION OR GANGRENE: ICD-10-CM

## 2018-05-23 PROCEDURE — 71045 X-RAY EXAM CHEST 1 VIEW: CPT

## 2018-05-23 PROCEDURE — 36415 COLL VENOUS BLD VENIPUNCTURE: CPT

## 2018-05-23 PROCEDURE — 85652 RBC SED RATE AUTOMATED: CPT

## 2018-05-23 PROCEDURE — 86901 BLOOD TYPING SEROLOGIC RH(D): CPT

## 2018-05-23 PROCEDURE — 86923 COMPATIBILITY TEST ELECTRIC: CPT

## 2018-05-23 PROCEDURE — 83550 IRON BINDING TEST: CPT

## 2018-05-23 PROCEDURE — 88305 TISSUE EXAM BY PATHOLOGIST: CPT

## 2018-05-23 PROCEDURE — 84100 ASSAY OF PHOSPHORUS: CPT

## 2018-05-23 PROCEDURE — 83735 ASSAY OF MAGNESIUM: CPT

## 2018-05-23 PROCEDURE — 86140 C-REACTIVE PROTEIN: CPT

## 2018-05-23 PROCEDURE — 84466 ASSAY OF TRANSFERRIN: CPT

## 2018-05-23 PROCEDURE — 85610 PROTHROMBIN TIME: CPT

## 2018-05-23 PROCEDURE — 70450 CT HEAD/BRAIN W/O DYE: CPT

## 2018-05-23 PROCEDURE — 73560 X-RAY EXAM OF KNEE 1 OR 2: CPT

## 2018-05-23 PROCEDURE — 86850 RBC ANTIBODY SCREEN: CPT

## 2018-05-23 PROCEDURE — 85027 COMPLETE CBC AUTOMATED: CPT

## 2018-05-23 PROCEDURE — 97162 PT EVAL MOD COMPLEX 30 MIN: CPT

## 2018-05-23 PROCEDURE — P9016: CPT

## 2018-05-23 PROCEDURE — 85025 COMPLETE CBC W/AUTO DIFF WBC: CPT

## 2018-05-23 PROCEDURE — 80048 BASIC METABOLIC PNL TOTAL CA: CPT

## 2018-05-23 PROCEDURE — 93005 ELECTROCARDIOGRAM TRACING: CPT

## 2018-05-23 PROCEDURE — 87040 BLOOD CULTURE FOR BACTERIA: CPT

## 2018-05-23 PROCEDURE — 82728 ASSAY OF FERRITIN: CPT

## 2018-05-23 PROCEDURE — 97116 GAIT TRAINING THERAPY: CPT

## 2018-05-23 PROCEDURE — 80053 COMPREHEN METABOLIC PANEL: CPT

## 2018-05-23 PROCEDURE — 73030 X-RAY EXAM OF SHOULDER: CPT

## 2018-05-23 PROCEDURE — 85730 THROMBOPLASTIN TIME PARTIAL: CPT

## 2018-05-23 PROCEDURE — 36430 TRANSFUSION BLD/BLD COMPNT: CPT

## 2018-05-23 PROCEDURE — 86900 BLOOD TYPING SEROLOGIC ABO: CPT

## 2018-05-23 PROCEDURE — 99285 EMERGENCY DEPT VISIT HI MDM: CPT

## 2018-05-23 PROCEDURE — 93970 EXTREMITY STUDY: CPT

## 2018-06-01 ENCOUNTER — INPATIENT (INPATIENT)
Facility: HOSPITAL | Age: 83
LOS: 3 days | Discharge: NURSING, REHAB OR LONG-TERM CARE | DRG: 812 | End: 2018-06-05
Attending: SPECIALIST | Admitting: SPECIALIST
Payer: MEDICARE

## 2018-06-01 VITALS
RESPIRATION RATE: 18 BRPM | TEMPERATURE: 98 F | OXYGEN SATURATION: 99 % | HEART RATE: 69 BPM | SYSTOLIC BLOOD PRESSURE: 109 MMHG | DIASTOLIC BLOOD PRESSURE: 64 MMHG

## 2018-06-01 DIAGNOSIS — Z29.9 ENCOUNTER FOR PROPHYLACTIC MEASURES, UNSPECIFIED: ICD-10-CM

## 2018-06-01 DIAGNOSIS — G30.9 ALZHEIMER'S DISEASE, UNSPECIFIED: ICD-10-CM

## 2018-06-01 DIAGNOSIS — I10 ESSENTIAL (PRIMARY) HYPERTENSION: ICD-10-CM

## 2018-06-01 DIAGNOSIS — R63.8 OTHER SYMPTOMS AND SIGNS CONCERNING FOOD AND FLUID INTAKE: ICD-10-CM

## 2018-06-01 DIAGNOSIS — D64.9 ANEMIA, UNSPECIFIED: ICD-10-CM

## 2018-06-01 LAB
ALBUMIN SERPL ELPH-MCNC: 3.4 G/DL — SIGNIFICANT CHANGE UP (ref 3.3–5)
ALP SERPL-CCNC: 69 U/L — SIGNIFICANT CHANGE UP (ref 40–120)
ALT FLD-CCNC: 14 U/L — SIGNIFICANT CHANGE UP (ref 10–45)
ANION GAP SERPL CALC-SCNC: 12 MMOL/L — SIGNIFICANT CHANGE UP (ref 5–17)
APTT BLD: 27.6 SEC — SIGNIFICANT CHANGE UP (ref 27.5–37.4)
AST SERPL-CCNC: 20 U/L — SIGNIFICANT CHANGE UP (ref 10–40)
BASOPHILS NFR BLD AUTO: 0.3 % — SIGNIFICANT CHANGE UP (ref 0–2)
BILIRUB DIRECT SERPL-MCNC: <0.2 MG/DL — SIGNIFICANT CHANGE UP (ref 0–0.2)
BILIRUB INDIRECT FLD-MCNC: >0 MG/DL — LOW (ref 0.2–1)
BILIRUB SERPL-MCNC: 0.2 MG/DL — SIGNIFICANT CHANGE UP (ref 0.2–1.2)
BILIRUB SERPL-MCNC: 0.2 MG/DL — SIGNIFICANT CHANGE UP (ref 0.2–1.2)
BUN SERPL-MCNC: 32 MG/DL — HIGH (ref 7–23)
CALCIUM SERPL-MCNC: 8.7 MG/DL — SIGNIFICANT CHANGE UP (ref 8.4–10.5)
CHLORIDE SERPL-SCNC: 102 MMOL/L — SIGNIFICANT CHANGE UP (ref 96–108)
CO2 SERPL-SCNC: 25 MMOL/L — SIGNIFICANT CHANGE UP (ref 22–31)
CREAT SERPL-MCNC: 0.91 MG/DL — SIGNIFICANT CHANGE UP (ref 0.5–1.3)
EOSINOPHIL NFR BLD AUTO: 0.5 % — SIGNIFICANT CHANGE UP (ref 0–6)
FERRITIN SERPL-MCNC: 79 NG/ML — SIGNIFICANT CHANGE UP (ref 15–150)
GLUCOSE BLDC GLUCOMTR-MCNC: 122 MG/DL — HIGH (ref 70–99)
GLUCOSE SERPL-MCNC: 106 MG/DL — HIGH (ref 70–99)
HAPTOGLOB SERPL-MCNC: 256 MG/DL — HIGH (ref 34–200)
HCT VFR BLD CALC: 22.3 % — LOW (ref 34.5–45)
HCT VFR BLD CALC: 29.3 % — LOW (ref 34.5–45)
HGB BLD-MCNC: 6.8 G/DL — CRITICAL LOW (ref 11.5–15.5)
HGB BLD-MCNC: 9.6 G/DL — LOW (ref 11.5–15.5)
INR BLD: 1 — SIGNIFICANT CHANGE UP (ref 0.88–1.16)
IRON SATN MFR SERPL: 10 % — LOW (ref 14–50)
IRON SATN MFR SERPL: 22 UG/DL — LOW (ref 30–160)
LIDOCAIN IGE QN: 53 U/L — SIGNIFICANT CHANGE UP (ref 7–60)
LYMPHOCYTES # BLD AUTO: 15.2 % — SIGNIFICANT CHANGE UP (ref 13–44)
MAGNESIUM SERPL-MCNC: 2.2 MG/DL — SIGNIFICANT CHANGE UP (ref 1.6–2.6)
MCHC RBC-ENTMCNC: 26.5 PG — LOW (ref 27–34)
MCHC RBC-ENTMCNC: 28 PG — SIGNIFICANT CHANGE UP (ref 27–34)
MCHC RBC-ENTMCNC: 30.5 G/DL — LOW (ref 32–36)
MCHC RBC-ENTMCNC: 32.8 G/DL — SIGNIFICANT CHANGE UP (ref 32–36)
MCV RBC AUTO: 85.4 FL — SIGNIFICANT CHANGE UP (ref 80–100)
MCV RBC AUTO: 86.8 FL — SIGNIFICANT CHANGE UP (ref 80–100)
MONOCYTES NFR BLD AUTO: 6.5 % — SIGNIFICANT CHANGE UP (ref 2–14)
NEUTROPHILS NFR BLD AUTO: 77.5 % — HIGH (ref 43–77)
PLATELET # BLD AUTO: 367 K/UL — SIGNIFICANT CHANGE UP (ref 150–400)
PLATELET # BLD AUTO: 432 K/UL — HIGH (ref 150–400)
POTASSIUM SERPL-MCNC: 4 MMOL/L — SIGNIFICANT CHANGE UP (ref 3.5–5.3)
POTASSIUM SERPL-SCNC: 4 MMOL/L — SIGNIFICANT CHANGE UP (ref 3.5–5.3)
PROT SERPL-MCNC: 6.1 G/DL — SIGNIFICANT CHANGE UP (ref 6–8.3)
PROTHROM AB SERPL-ACNC: 11.1 SEC — SIGNIFICANT CHANGE UP (ref 9.8–12.7)
RBC # BLD: 2.57 M/UL — LOW (ref 3.8–5.2)
RBC # BLD: 2.57 M/UL — LOW (ref 3.8–5.2)
RBC # BLD: 3.43 M/UL — LOW (ref 3.8–5.2)
RBC # FLD: 14.7 % — SIGNIFICANT CHANGE UP (ref 10.3–16.9)
RBC # FLD: 15.2 % — SIGNIFICANT CHANGE UP (ref 10.3–16.9)
RETICS/RBC NFR: 2.2 % — SIGNIFICANT CHANGE UP (ref 0.5–2.5)
SODIUM SERPL-SCNC: 139 MMOL/L — SIGNIFICANT CHANGE UP (ref 135–145)
TIBC SERPL-MCNC: 229 UG/DL — SIGNIFICANT CHANGE UP (ref 220–430)
UIBC SERPL-MCNC: 207 UG/DL — SIGNIFICANT CHANGE UP (ref 110–370)
WBC # BLD: 7.7 K/UL — SIGNIFICANT CHANGE UP (ref 3.8–10.5)
WBC # BLD: 9.7 K/UL — SIGNIFICANT CHANGE UP (ref 3.8–10.5)
WBC # FLD AUTO: 7.7 K/UL — SIGNIFICANT CHANGE UP (ref 3.8–10.5)
WBC # FLD AUTO: 9.7 K/UL — SIGNIFICANT CHANGE UP (ref 3.8–10.5)

## 2018-06-01 PROCEDURE — 93010 ELECTROCARDIOGRAM REPORT: CPT

## 2018-06-01 PROCEDURE — 71045 X-RAY EXAM CHEST 1 VIEW: CPT | Mod: 26

## 2018-06-01 PROCEDURE — 99285 EMERGENCY DEPT VISIT HI MDM: CPT | Mod: 25

## 2018-06-01 RX ORDER — DULOXETINE HYDROCHLORIDE 30 MG/1
60 CAPSULE, DELAYED RELEASE ORAL DAILY
Qty: 0 | Refills: 0 | Status: DISCONTINUED | OUTPATIENT
Start: 2018-06-01 | End: 2018-06-05

## 2018-06-01 RX ORDER — SENNA PLUS 8.6 MG/1
1 TABLET ORAL AT BEDTIME
Qty: 0 | Refills: 0 | Status: DISCONTINUED | OUTPATIENT
Start: 2018-06-01 | End: 2018-06-05

## 2018-06-01 RX ORDER — FERROUS SULFATE 325(65) MG
325 TABLET ORAL DAILY
Qty: 0 | Refills: 0 | Status: DISCONTINUED | OUTPATIENT
Start: 2018-06-01 | End: 2018-06-05

## 2018-06-01 RX ORDER — PANTOPRAZOLE SODIUM 20 MG/1
40 TABLET, DELAYED RELEASE ORAL
Qty: 0 | Refills: 0 | Status: DISCONTINUED | OUTPATIENT
Start: 2018-06-01 | End: 2018-06-05

## 2018-06-01 RX ORDER — RADIOPAQUE PVC MARKERS/BARIUM 24MARKERS
30 CAPSULE ORAL ONCE
Qty: 0 | Refills: 0 | Status: DISCONTINUED | OUTPATIENT
Start: 2018-06-01 | End: 2018-06-01

## 2018-06-01 RX ORDER — DOCUSATE SODIUM 100 MG
100 CAPSULE ORAL DAILY
Qty: 0 | Refills: 0 | Status: DISCONTINUED | OUTPATIENT
Start: 2018-06-01 | End: 2018-06-05

## 2018-06-01 RX ORDER — LANOLIN ALCOHOL/MO/W.PET/CERES
5 CREAM (GRAM) TOPICAL AT BEDTIME
Qty: 0 | Refills: 0 | Status: DISCONTINUED | OUTPATIENT
Start: 2018-06-01 | End: 2018-06-05

## 2018-06-01 RX ORDER — RADIOPAQUE PVC MARKERS/BARIUM 24MARKERS
450 CAPSULE ORAL ONCE
Qty: 0 | Refills: 0 | Status: DISCONTINUED | OUTPATIENT
Start: 2018-06-01 | End: 2018-06-04

## 2018-06-01 RX ORDER — SENNA PLUS 8.6 MG/1
1 TABLET ORAL DAILY
Qty: 0 | Refills: 0 | Status: DISCONTINUED | OUTPATIENT
Start: 2018-06-01 | End: 2018-06-01

## 2018-06-01 RX ORDER — ASCORBIC ACID 60 MG
100 TABLET,CHEWABLE ORAL DAILY
Qty: 0 | Refills: 0 | Status: DISCONTINUED | OUTPATIENT
Start: 2018-06-01 | End: 2018-06-01

## 2018-06-01 RX ORDER — ASCORBIC ACID 60 MG
250 TABLET,CHEWABLE ORAL DAILY
Qty: 0 | Refills: 0 | Status: DISCONTINUED | OUTPATIENT
Start: 2018-06-01 | End: 2018-06-05

## 2018-06-01 RX ADMIN — Medication 100 MILLIGRAM(S): at 12:58

## 2018-06-01 RX ADMIN — Medication 250 MILLIGRAM(S): at 12:58

## 2018-06-01 RX ADMIN — Medication 325 MILLIGRAM(S): at 12:58

## 2018-06-01 RX ADMIN — PANTOPRAZOLE SODIUM 40 MILLIGRAM(S): 20 TABLET, DELAYED RELEASE ORAL at 18:52

## 2018-06-01 RX ADMIN — PANTOPRAZOLE SODIUM 40 MILLIGRAM(S): 20 TABLET, DELAYED RELEASE ORAL at 12:57

## 2018-06-01 RX ADMIN — Medication 30 MILLILITER(S): at 13:53

## 2018-06-01 RX ADMIN — DULOXETINE HYDROCHLORIDE 60 MILLIGRAM(S): 30 CAPSULE, DELAYED RELEASE ORAL at 12:57

## 2018-06-01 RX ADMIN — Medication 5 MILLIGRAM(S): at 22:22

## 2018-06-01 RX ADMIN — SENNA PLUS 1 TABLET(S): 8.6 TABLET ORAL at 22:22

## 2018-06-01 NOTE — H&P ADULT - NSHPLABSRESULTS_GEN_ALL_CORE
LABS:                        6.8    9.7   )-----------( 432      ( 01 Jun 2018 03:30 )             22.3     06-01    139  |  102  |  32<H>  ----------------------------<  106<H>  4.0   |  25  |  0.91    Ca    8.7      01 Jun 2018 03:33  Mg     2.2     06-01    TPro  6.1  /  Alb  3.4  /  TBili  0.2  /  DBili  <0.2  /  AST  20  /  ALT  14  /  AlkPhos  69  06-01    PT/INR - ( 01 Jun 2018 03:30 )   PT: 11.1 sec;   INR: 1.00          PTT - ( 01 Jun 2018 03:30 )  PTT:27.6 sec      RADIOLOGY & ADDITIONAL TESTS: Reviewed.

## 2018-06-01 NOTE — DISCHARGE NOTE ADULT - OTHER SIGNIFICANT FINDINGS
< from: CT Abdomen and Pelvis No Cont (06.02.18 @ 15:26) >  IMPRESSION:  No evidence of gastric outlet obstruction or bulky upper   abdominal/retroperitoneal lymphadenopathy. Evaluation for gastric/pyloric   mass is limited without intravenous or enteric contrast material.    Endoscopy  - 1 large duodenal ulcer, non-bleeding - status post biopsy.

## 2018-06-01 NOTE — DISCHARGE NOTE ADULT - CARE PROVIDERS DIRECT ADDRESSES
,DirectAddress_Unknown,DirectAddress_Unknown,[email or web address]

## 2018-06-01 NOTE — ED ADULT NURSE NOTE - PMH
Alzheimer disease    Diverticulitis of colon  Diverticulitis  Hypertension    Rheumatoid arthritis    Spinal stenosis

## 2018-06-01 NOTE — DISCHARGE NOTE ADULT - PATIENT PORTAL LINK FT
You can access the [facility] Patient Portal, offered by [facility], by registering with the following website: [email or web address]

## 2018-06-01 NOTE — PROGRESS NOTE ADULT - SUBJECTIVE AND OBJECTIVE BOX
GERONIMO MACK    HPI:  HPI: 89F [facility] spinal stenosis, RA, HTN, Alzheimer's dementia, recent admission for anemia s/p EGD and colonoscopy (no acute bleed, pyloric mass biopsy normal), presents from [facility] with anemia 6.8. Patient AOx1, pleasant, but does not answer questions appropriately. Unable to obtain history, attempted to call HCP Kaylin Martin, no answer, information obtained from chart.    ED vitals:  T(F): 97.9  HR: 69   BP: 109/64    RR: 18   SpO2: 99%    Rectal exam without blood. Hgb 6.8. Hemolysis labs normal. (01 Jun 2018 05:00)    events noted.  pt known to me from admission in 4/18.    Allergies    alprazolam (Other (Unknown))  chocolate (Unknown)  iodine (Anaphylaxis)  levofloxacin (Unknown)  Nuts (Unknown)  penicillin (Unknown)  shellfish (Short breath)    Intolerances        bisacodyl 5 milliGRAM(s) Oral at bedtime  DULoxetine 60 milliGRAM(s) Oral daily  melatonin 5 milliGRAM(s) Oral at bedtime  pantoprazole  Injectable 40 milliGRAM(s) IV Push two times a day      Vital Signs Last 24 Hrs  T(C): 36.3 (01 Jun 2018 09:30), Max: 36.7 (01 Jun 2018 05:50)  T(F): 97.3 (01 Jun 2018 09:30), Max: 98.1 (01 Jun 2018 05:50)  HR: 63 (01 Jun 2018 09:30) (63 - 69)  BP: 162/65 (01 Jun 2018 09:30) (109/64 - 162/65)  BP(mean): --  RR: 17 (01 Jun 2018 09:30) (17 - 19)  SpO2: 100% (01 Jun 2018 09:30) (98% - 100%)    PHYSICAL EXAM:      Constitutional:nad    Respiratory:cta    Cardiovascular:[id number]    Gastrointestinal:soft nt bs                              6.8    9.7   )-----------( 432      ( 01 Jun 2018 03:30 )             22.3       06-01    139  |  102  |  32<H>  ----------------------------<  106<H>  4.0   |  25  |  0.91    Ca    8.7      01 Jun 2018 03:33  Mg     2.2     06-01    TPro  6.1  /  Alb  3.4  /  TBili  0.2  /  DBili  <0.2  /  AST  20  /  ALT  14  /  AlkPhos  69  06-01      A/P  transfuse prbc to get hgb above 9  iv ppi  will arrange for egd

## 2018-06-01 NOTE — ED ADULT NURSE REASSESSMENT NOTE - NS ED NURSE REASSESS COMMENT FT1
RN spoke with pt dtr Kaylin per pt's request. Made aware that pt is in the ER and is admitted and that pt is concerned that her family is not aware of her ware about. Pt made aware that RN spoke to her dtr. Pt calmer after encounter. Pt awaiting dispo. PRBC infusion continues. Close monitoring continues.

## 2018-06-01 NOTE — ED PROVIDER NOTE - MEDICAL DECISION MAKING DETAILS
plan transfusion and admission as planned upon arrival with hgb < 7 plan transfusion and admission as planned upon arrival with hgb < 7 . plan transfusion and admission as planned upon arrival with hgb < 7 .-> difficulty obtaining blood consent and pursuing 2 PC with Attendings plan transfusion and admission as planned upon arrival with hgb < 7 .-> difficulty obtaining blood consent and pursuing 2 PC with Attendings and + Dr barreto agreesas 1 and ED Attending as other + dr barreto knows family and aware they accept the transfusions for their loved one

## 2018-06-01 NOTE — ED PROVIDER NOTE - CARE PLAN
Principal Discharge DX:	Anemia  Secondary Diagnosis:	Alzheimer disease  Secondary Diagnosis:	Hypertension

## 2018-06-01 NOTE — H&P ADULT - ASSESSMENT
89F PMH spinal stenosis, RA, HTN, Alzheimer's dementia, recent admission for anemia s/p EGD and colonoscopy (no acute bleed, pyloric mass biopsy normal), presents from [facility] with anemia 6.8.

## 2018-06-01 NOTE — H&P ADULT - PROBLEM SELECTOR PLAN 1
Normocytic, normochromatic. Hgb 6.8 on admission. Not tachycardic, or hypotensive likely not acute GIB. Recently s/p EGD and colonoscopy with diverticulosis without evidence of acute bleed; pyloric mass biopsied and normal intestinal mucosa. Iron studies likely mixed iron deficiency and ACD. Hemolysis labs wnl.  - transfuse 1u prbcs for Hgb>7  - would start iron supplementation Normocytic, normochromatic. Hgb 6.8 on admission. Not tachycardic, or hypotensive likely not acute GIB. Recently s/p EGD and colonoscopy with diverticulosis without evidence of acute bleed; pyloric mass biopsied and normal intestinal mucosa. Iron studies likely mixed iron deficiency and ACD. Hemolysis labs wnl.  - transfuse 1u prbcs for Hgb>7; unable to reach HCP Kaylin Martin, 2PC consent performed in ED  - f/u fecal occult  - PPI IV bid for now  - would start iron supplementation  - GI consult Dr. Quezada in am Normocytic, normochromatic. Hgb 6.8 on admission. Not tachycardic, or hypotensive likely not acute GIB. Recently s/p EGD and colonoscopy with diverticulosis without evidence of acute bleed; pyloric mass biopsied and normal intestinal mucosa. Iron studies likely mixed iron deficiency and ACD. Hemolysis labs wnl.  - transfuse 1u prbcs for Hgb>7; unable to reach HCP Kaylin Martin, 2PC consent performed in ED  - f/u fecal occult  - PPI IV bid for now  - f/u orthostatics  - would start iron supplementation  - GI consult Dr. Quezada in am

## 2018-06-01 NOTE — ED PROVIDER NOTE - OBJECTIVE STATEMENT
sent from rehab for admission and transfusion  with Hgb < 7 and Hx unspecified anemia. History limited to primarily chart information     recent admission ~ 6 weeks ago pasted here now    89F PMH spinal stenosis, RA, HTN, Alzheimer's presented to [facility] from outpatient clinic with low hemoglobin, found to have Hb 7.1 with positive FOBT and admitted for work up. GI was consulted (Dr. 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 Pt went for colonoscopy which showed diverticulosis without evidence of acute bleed.. Of note, pt with sundowning during hospitalization and combative behavior toward staff requiring IM Haldol and Seroquel intermittently. Pt HD stable, ready for discharge with close outpatient f/u with GI and PMD for biopsy results and continued care

## 2018-06-01 NOTE — DISCHARGE NOTE ADULT - MEDICATION SUMMARY - MEDICATIONS TO TAKE
I will START or STAY ON the medications listed below when I get home from the hospital:    Tylenol  -- 325  by mouth 6 times a day, As Needed  -- Indication: For Pain     losartan 25 mg oral tablet  -- 1 tab(s) by mouth once a day  -- Indication: For Hypertension     DULoxetine  -- 60 milligram(s) by mouth once a day  -- Indication: For Depression     bisacodyl 5 mg oral delayed release tablet  -- 1 tab(s) by mouth once a day (at bedtime)  -- Indication: For Constipation

## 2018-06-01 NOTE — DISCHARGE NOTE ADULT - HOSPITAL COURSE
89F PMH spinal stenosis, RA, HTN, Alzheimer's dementia, recent admission for anemia s/p EGD and colonoscopy (no acute bleed, pyloric mass biopsy normal), presents from [facility] with anemia 6.8. Patient AOx1, pleasant, but does not answer questions appropriately. Patient received 1U of pRBC with improvement in her hemoglobin to ______. CT abdomen and pelvis with oral contrast demonstrated _________. EGD showed_______. 89F PMH spinal stenosis, RA, HTN, Alzheimer's dementia, recent admission for anemia s/p EGD and colonoscopy (no acute bleed, pyloric mass biopsy normal), presents from [facility] with anemia 6.8. Patient AOx1, pleasant, but does not answer questions appropriately. Patient received 1U of pRBC with improvement in her hemoglobin to 9.3.  CT abdomen and pelvis with oral contrast demonstrated Evaluation for gastric/pyloric mass is limited without intravenous or enteric contrast material. EGD showed a large duodenal ulcer, no acute bleeding, has been biopsied - to be followed up in clinic.    Stable for d/c back to [facility].

## 2018-06-01 NOTE — ED ADULT NURSE REASSESSMENT NOTE - NS ED NURSE REASSESS COMMENT FT1
No s/s of transfusion reaction noted. Pt tolerating well. VSS remains stable. Close monitoring continues.

## 2018-06-01 NOTE — H&P ADULT - NSHPPHYSICALEXAM_GEN_ALL_CORE
VITAL SIGNS:  T(F): 97.9 (06-01-18 @ 03:10)  HR: 69 (06-01-18 @ 03:10)  BP: 109/64 (06-01-18 @ 03:10)  RR: 18 (06-01-18 @ 03:10)  SpO2: 99% (06-01-18 @ 03:10)  Wt(kg): --    PHYSICAL EXAM:    Constitutional: elderly, pleasantly demented, NAD  Eyes: PERRL, +conjunctival pallor  ENMT: mildly dry MM  Neck: supple, no JVD  Respiratory: CTAb/l, no wheezes/rales/rhonchi  Cardiovascular: RRR, normal S1/S2, 3/6 systolic murmur LSB  Gastrointestinal: soft, NTND, BS normal  Rectal: no blood or stool  Extremities: no edema or cyanosis  Vascular: WWP, DP 2+ b/l  Neurological: alert, oriented x1, responds to some commands, does not answer questions appropriately, moving all extremities spontaneously  Musculoskeletal: no joint swelling

## 2018-06-01 NOTE — DISCHARGE NOTE ADULT - CARE PROVIDER_API CALL
Richmond Bello), Internal Medicine  [address] [address]  Phone: [phone]  Fax: [phone]    Arun Sow), Cardiovascular Disease; Internal Medicine  [address] [place] [address]  Phone: [phone]  Fax: [phone]    Edwar Quezada), Gastroenterology; Internal Medicine  [address]  [place] [address]  Phone: [phone]  Fax: [phone]

## 2018-06-01 NOTE — DISCHARGE NOTE ADULT - PLAN OF CARE
You were admitted to the hospital for anemia to 6.8. You received a blood transfusion with improvement in your hemoglobin. No changes were made to your medications for Alzheimer disease. Please followup with your doctor for continued management. No changes were made to your medications for hypertension. Please followup with your doctor for continued management. Prompt follow up with Dr Sow and Dr Quezada as an outpatient for repeat labs and biopsy results. You were admitted to the hospital for anemia to 6.8. You received a blood transfusion with improvement in your hemoglobin. You also underwent an endoscopy to assess for any signs of bleeding given a stool test that was positive for blood. It was negative for any acute sources of bleed, however, there were was a large ulcer in the duodenal area that was biopsied. You will follow up with Dr Quezada and Juanjose regarding the results of this test. You should see Dr Sow in clinic within 2 weeks for follow up labs.

## 2018-06-01 NOTE — ED ADULT NURSE REASSESSMENT NOTE - NS ED NURSE REASSESS COMMENT FT1
PRBC ready in blood bank. pt pending consent prior to administration. Per AISHWARYA Pelletier, pt is still pending consent. Will follow up.

## 2018-06-01 NOTE — DISCHARGE NOTE ADULT - CARE PLAN
Principal Discharge DX:	Anemia  Assessment and plan of treatment:	You were admitted to the hospital for anemia to 6.8. You received a blood transfusion with improvement in your hemoglobin.  Secondary Diagnosis:	Alzheimer disease  Assessment and plan of treatment:	No changes were made to your medications for Alzheimer disease. Please followup with your doctor for continued management.  Secondary Diagnosis:	Hypertension  Assessment and plan of treatment:	No changes were made to your medications for hypertension. Please followup with your doctor for continued management. Principal Discharge DX:	Anemia  Goal:	Prompt follow up with Dr Sow and Dr Quezada as an outpatient for repeat labs and biopsy results.  Assessment and plan of treatment:	You were admitted to the hospital for anemia to 6.8. You received a blood transfusion with improvement in your hemoglobin. You also underwent an endoscopy to assess for any signs of bleeding given a stool test that was positive for blood. It was negative for any acute sources of bleed, however, there were was a large ulcer in the duodenal area that was biopsied. You will follow up with Dr Quezada and Juanjose regarding the results of this test. You should see Dr Sow in clinic within 2 weeks for follow up labs.  Secondary Diagnosis:	Alzheimer disease  Assessment and plan of treatment:	No changes were made to your medications for Alzheimer disease. Please followup with your doctor for continued management.  Secondary Diagnosis:	Hypertension  Assessment and plan of treatment:	No changes were made to your medications for hypertension. Please followup with your doctor for continued management.

## 2018-06-01 NOTE — H&P ADULT - HISTORY OF PRESENT ILLNESS
HPI: 89F PMH spinal stenosis, RA, HTN, Alzheimer's dementia, recent admission for anemia s/p EGD and colonoscopy (no acute bleed, pyloric mass biopsy normal), presents from [facility] with anemia 6.8. Patient AOx1 unable to obtain history, information obtained from chart. HPI: 89F PMH spinal stenosis, RA, HTN, Alzheimer's dementia, recent admission for anemia s/p EGD and colonoscopy (no acute bleed, pyloric mass biopsy normal), presents from [facility] with anemia 6.8. Patient AOx1, pleasant, but does not answer questions appropriately. Unable to obtain history, attempted to call HCP Kaylin Martin, no answer, information obtained from chart.    ED vitals:  T(F): 97.9  HR: 69   BP: 109/64    RR: 18   SpO2: 99%    Rectal exam without blood. Hgb 6.8. Hemolysis labs normal.

## 2018-06-02 LAB
APPEARANCE UR: ABNORMAL
BACTERIA # UR AUTO: ABNORMAL /HPF
BILIRUB UR-MCNC: NEGATIVE — SIGNIFICANT CHANGE UP
COLOR SPEC: YELLOW — SIGNIFICANT CHANGE UP
DIFF PNL FLD: ABNORMAL
EPI CELLS # UR: ABNORMAL /HPF (ref 0–5)
GLUCOSE UR QL: NEGATIVE — SIGNIFICANT CHANGE UP
HCT VFR BLD CALC: 26.1 % — LOW (ref 34.5–45)
HCT VFR BLD CALC: 26.5 % — LOW (ref 34.5–45)
HCT VFR BLD CALC: 27.7 % — LOW (ref 34.5–45)
HGB BLD-MCNC: 8.6 G/DL — LOW (ref 11.5–15.5)
HGB BLD-MCNC: 8.9 G/DL — LOW (ref 11.5–15.5)
HGB BLD-MCNC: 8.9 G/DL — LOW (ref 11.5–15.5)
KETONES UR-MCNC: ABNORMAL MG/DL
LEUKOCYTE ESTERASE UR-ACNC: ABNORMAL
MCHC RBC-ENTMCNC: 27.6 PG — SIGNIFICANT CHANGE UP (ref 27–34)
MCHC RBC-ENTMCNC: 27.8 PG — SIGNIFICANT CHANGE UP (ref 27–34)
MCHC RBC-ENTMCNC: 28.1 PG — SIGNIFICANT CHANGE UP (ref 27–34)
MCHC RBC-ENTMCNC: 32.1 G/DL — SIGNIFICANT CHANGE UP (ref 32–36)
MCHC RBC-ENTMCNC: 33 G/DL — SIGNIFICANT CHANGE UP (ref 32–36)
MCHC RBC-ENTMCNC: 33.6 G/DL — SIGNIFICANT CHANGE UP (ref 32–36)
MCV RBC AUTO: 83.6 FL — SIGNIFICANT CHANGE UP (ref 80–100)
MCV RBC AUTO: 84.5 FL — SIGNIFICANT CHANGE UP (ref 80–100)
MCV RBC AUTO: 86 FL — SIGNIFICANT CHANGE UP (ref 80–100)
NITRITE UR-MCNC: POSITIVE
PH UR: 5.5 — SIGNIFICANT CHANGE UP (ref 5–8)
PLATELET # BLD AUTO: 352 K/UL — SIGNIFICANT CHANGE UP (ref 150–400)
PLATELET # BLD AUTO: 374 K/UL — SIGNIFICANT CHANGE UP (ref 150–400)
PLATELET # BLD AUTO: 396 K/UL — SIGNIFICANT CHANGE UP (ref 150–400)
PROT UR-MCNC: ABNORMAL MG/DL
RBC # BLD: 3.09 M/UL — LOW (ref 3.8–5.2)
RBC # BLD: 3.17 M/UL — LOW (ref 3.8–5.2)
RBC # BLD: 3.22 M/UL — LOW (ref 3.8–5.2)
RBC # FLD: 14.9 % — SIGNIFICANT CHANGE UP (ref 10.3–16.9)
RBC # FLD: 15.1 % — SIGNIFICANT CHANGE UP (ref 10.3–16.9)
RBC # FLD: 15.2 % — SIGNIFICANT CHANGE UP (ref 10.3–16.9)
RBC CASTS # UR COMP ASSIST: < 5 /HPF — SIGNIFICANT CHANGE UP
SP GR SPEC: >=1.03 — SIGNIFICANT CHANGE UP (ref 1–1.03)
UROBILINOGEN FLD QL: 0.2 E.U./DL — SIGNIFICANT CHANGE UP
WBC # BLD: 6.3 K/UL — SIGNIFICANT CHANGE UP (ref 3.8–10.5)
WBC # BLD: 6.3 K/UL — SIGNIFICANT CHANGE UP (ref 3.8–10.5)
WBC # BLD: 7.7 K/UL — SIGNIFICANT CHANGE UP (ref 3.8–10.5)
WBC # FLD AUTO: 6.3 K/UL — SIGNIFICANT CHANGE UP (ref 3.8–10.5)
WBC # FLD AUTO: 6.3 K/UL — SIGNIFICANT CHANGE UP (ref 3.8–10.5)
WBC # FLD AUTO: 7.7 K/UL — SIGNIFICANT CHANGE UP (ref 3.8–10.5)
WBC UR QL: ABNORMAL /HPF

## 2018-06-02 PROCEDURE — 74176 CT ABD & PELVIS W/O CONTRAST: CPT | Mod: 26

## 2018-06-02 RX ADMIN — Medication 100 MILLIGRAM(S): at 12:23

## 2018-06-02 RX ADMIN — Medication 5 MILLIGRAM(S): at 23:51

## 2018-06-02 RX ADMIN — SENNA PLUS 1 TABLET(S): 8.6 TABLET ORAL at 23:51

## 2018-06-02 RX ADMIN — Medication 5 MILLIGRAM(S): at 23:52

## 2018-06-02 RX ADMIN — Medication 250 MILLIGRAM(S): at 12:23

## 2018-06-02 RX ADMIN — PANTOPRAZOLE SODIUM 40 MILLIGRAM(S): 20 TABLET, DELAYED RELEASE ORAL at 17:35

## 2018-06-02 RX ADMIN — DULOXETINE HYDROCHLORIDE 60 MILLIGRAM(S): 30 CAPSULE, DELAYED RELEASE ORAL at 12:23

## 2018-06-02 RX ADMIN — Medication 325 MILLIGRAM(S): at 12:23

## 2018-06-02 RX ADMIN — PANTOPRAZOLE SODIUM 40 MILLIGRAM(S): 20 TABLET, DELAYED RELEASE ORAL at 07:28

## 2018-06-02 NOTE — PROGRESS NOTE ADULT - SUBJECTIVE AND OBJECTIVE BOX
OVERNIGHT EVENTS: No acute overnight events.    SUBJECTIVE / INTERVAL HPI: Patient seen and examined at bedside. Patient is sleepy but arousable this morning, she is AAOx1 but not offering any complaints. No CP/SOB/n/v/abdominal pain.     VITAL SIGNS:  Vital Signs Last 24 Hrs  T(C): 36.7 (02 Jun 2018 08:58), Max: 36.8 (01 Jun 2018 17:54)  T(F): 98.1 (02 Jun 2018 08:58), Max: 98.2 (01 Jun 2018 17:54)  HR: 70 (02 Jun 2018 08:58) (67 - 91)  BP: 118/67 (02 Jun 2018 08:58) (114/66 - 158/76)  RR: 17 (02 Jun 2018 08:58) (17 - 18)  SpO2: 97% (02 Jun 2018 08:58) (95% - 100%)    PHYSICAL EXAM:  General: WDWN  HEENT: NC/AT; PERRL, anicteric sclera; MMM  Neck: supple  Cardiovascular: +S1/S2; RRR; no M/R/G on auscultation  Respiratory: CTA B/L; no W/R/R  Gastrointestinal: soft, NT/ND; +BSx4  Extremities: WWP; no edema, clubbing or cyanosis  Vascular: 2+ radial, DP/PT pulses B/L  Neurological: AAOx3; no focal deficits    MEDICATIONS:  MEDICATIONS  (STANDING):  ascorbic acid 250 milliGRAM(s) Oral daily  barium sulfate 0.1% Suspension 450 milliLiter(s) Oral once  bisacodyl 5 milliGRAM(s) Oral at bedtime  docusate sodium 100 milliGRAM(s) Oral daily  DULoxetine 60 milliGRAM(s) Oral daily  ferrous    sulfate 325 milliGRAM(s) Oral daily  melatonin 5 milliGRAM(s) Oral at bedtime  pantoprazole  Injectable 40 milliGRAM(s) IV Push two times a day  senna 1 Tablet(s) Oral at bedtime    MEDICATIONS  (PRN):  aluminum hydroxide/magnesium hydroxide/simethicone Suspension 30 milliLiter(s) Oral every 6 hours PRN Dyspepsia      ALLERGIES:  Allergies  alprazolam (Other (Unknown))  chocolate (Unknown)  iodine (Anaphylaxis)  levofloxacin (Unknown)  Nuts (Unknown)  penicillin (Unknown)  shellfish (Short breath)        LABS:                        8.9    6.3   )-----------( 352      ( 02 Jun 2018 08:42 )             26.5     06-01    139  |  102  |  32<H>  ----------------------------<  106<H>  4.0   |  25  |  0.91    Ca    8.7      01 Jun 2018 03:33  Mg     2.2     06-01    TPro  6.1  /  Alb  3.4  /  TBili  0.2  /  DBili  <0.2  /  AST  20  /  ALT  14  /  AlkPhos  69  06-01    PT/INR - ( 01 Jun 2018 03:30 )   PT: 11.1 sec;   INR: 1.00          PTT - ( 01 Jun 2018 03:30 )  PTT:27.6 sec    CAPILLARY BLOOD GLUCOSE  POCT Blood Glucose.: 122 mg/dL (01 Jun 2018 17:54)      RADIOLOGY & ADDITIONAL TESTS: Reviewed. OVERNIGHT EVENTS: No acute overnight events. Per night staff, patient did not sleep much overnight.    SUBJECTIVE / INTERVAL HPI: Patient seen and examined at bedside. Patient is sleepy but arousable this morning, she is AAOx1 but not offering any complaints. No CP/SOB/n/v/abdominal pain.     VITAL SIGNS:  Vital Signs Last 24 Hrs  T(C): 36.7 (02 Jun 2018 08:58), Max: 36.8 (01 Jun 2018 17:54)  T(F): 98.1 (02 Jun 2018 08:58), Max: 98.2 (01 Jun 2018 17:54)  HR: 70 (02 Jun 2018 08:58) (67 - 91)  BP: 118/67 (02 Jun 2018 08:58) (114/66 - 158/76)  RR: 17 (02 Jun 2018 08:58) (17 - 18)  SpO2: 97% (02 Jun 2018 08:58) (95% - 100%)    PHYSICAL EXAM:  General: thin, elderly female, laying in bed sleeping, no acute distress and comfortable; sleeping but rousable by voice; confused upon waking but at her baseline  HEENT: pupils reactive; EOMI; MMM  Cardiovascular: +S1/S2; regular; +systolic murmur 3/6  Respiratory: CTA B/L; no W/R/R  Gastrointestinal: soft, NT/ND; +BSx4  Extremities: WWP; no edema  Vascular: 2+ radial, DP b/l  Neurological: AAOx1. Speech is fluent but responses are not always appropriate to the conversation. Responds to commands intermittently.     MEDICATIONS:  MEDICATIONS  (STANDING):  ascorbic acid 250 milliGRAM(s) Oral daily  barium sulfate 0.1% Suspension 450 milliLiter(s) Oral once  bisacodyl 5 milliGRAM(s) Oral at bedtime  docusate sodium 100 milliGRAM(s) Oral daily  DULoxetine 60 milliGRAM(s) Oral daily  ferrous    sulfate 325 milliGRAM(s) Oral daily  melatonin 5 milliGRAM(s) Oral at bedtime  pantoprazole  Injectable 40 milliGRAM(s) IV Push two times a day  senna 1 Tablet(s) Oral at bedtime    MEDICATIONS  (PRN):  aluminum hydroxide/magnesium hydroxide/simethicone Suspension 30 milliLiter(s) Oral every 6 hours PRN Dyspepsia      ALLERGIES:  Allergies  alprazolam (Other (Unknown))  chocolate (Unknown)  iodine (Anaphylaxis)  levofloxacin (Unknown)  Nuts (Unknown)  penicillin (Unknown)  shellfish (Short breath)        LABS:                        8.9    6.3   )-----------( 352      ( 02 Jun 2018 08:42 )             26.5     06-01    139  |  102  |  32<H>  ----------------------------<  106<H>  4.0   |  25  |  0.91    Ca    8.7      01 Jun 2018 03:33  Mg     2.2     06-01    TPro  6.1  /  Alb  3.4  /  TBili  0.2  /  DBili  <0.2  /  AST  20  /  ALT  14  /  AlkPhos  69  06-01    PT/INR - ( 01 Jun 2018 03:30 )   PT: 11.1 sec;   INR: 1.00          PTT - ( 01 Jun 2018 03:30 )  PTT:27.6 sec    CAPILLARY BLOOD GLUCOSE  POCT Blood Glucose.: 122 mg/dL (01 Jun 2018 17:54)      RADIOLOGY & ADDITIONAL TESTS: Reviewed.

## 2018-06-02 NOTE — PROGRESS NOTE ADULT - ASSESSMENT
89F PMH spinal stenosis, RA, HTN, Alzheimer's dementia, recent admission for anemia s/p EGD and colonoscopy (no acute bleed, pyloric mass biopsy normal), presents from [facility] with anemia 6.8. 89F PMH spinal stenosis, RA, HTN, Alzheimer's dementia, recent admission for anemia s/p EGD and colonoscopy (no acute bleed, pyloric mass biopsy normal), presents from [facility] with anemia 6.8, admitted for w/u of possible GIB.

## 2018-06-02 NOTE — PROGRESS NOTE ADULT - PROBLEM SELECTOR PLAN 4
No HSQ, SCDs. VTE ppx: SCD's; holding SQH in the setting of anemia and c/f bleed  GI ppx: PPI IV BID

## 2018-06-02 NOTE — PROGRESS NOTE ADULT - PROBLEM SELECTOR PLAN 5
DASH/TLC diet  No IVF  Replete lytes prn    Admit to [facility]. F: no IVF  E: monitor and replete PRN  N: Regular; NPO @ midnight on Sunday for scope on Mon  Dispo: [facility] pending further GI w/u F: no IVF  E: monitor and replete PRN  N: Regular; NPO @ midnight on Sunday for scope on Mon    Code Status: DNR/DNI  Dispo: [facility] pending further GI w/u

## 2018-06-02 NOTE — PROGRESS NOTE ADULT - PROBLEM SELECTOR PLAN 2
Pleasantly demented. Appears to be at baseline as I have met patient in the past.  - c/w celexa 60mg daily  - caution with seroquel as patient was oversedated with 12.5 only; may need sitter overnight Pleasantly demented. Appears to be at baseline as I have met patient in the past.  - c/w Celexa 60mg daily  - caution w/ Seroquel as oversedation with only 12.5 on past admission; can consider 0.5 Haldol IVP if agitation and self-danger overnight

## 2018-06-02 NOTE — PROGRESS NOTE ADULT - SUBJECTIVE AND OBJECTIVE BOX
GERONIMO MACK    HPI:  HPI: [id number] PMH spinal stenosis, RA, HTN, Alzheimer's dementia, recent admission for anemia s/p EGD and colonoscopy (no acute bleed, pyloric mass biopsy normal), presents from [facility] with anemia 6.8. Patient AOx1, pleasant, but does not answer questions appropriately. Unable to obtain history, attempted to call HCP Kaylin Martin, no answer, information obtained from chart.      no acute events  responded well to transfusion    ED vitals:  T(F): 97.9  HR: 69   BP: 109/64    RR: 18   SpO2: 99%    Rectal exam without blood. Hgb 6.8. Hemolysis labs normal. (01 Jun 2018 05:00)      Allergies    alprazolam (Other (Unknown))  chocolate (Unknown)  iodine (Anaphylaxis)  levofloxacin (Unknown)  Nuts (Unknown)  penicillin (Unknown)  shellfish (Short breath)    Intolerances        aluminum hydroxide/magnesium hydroxide/simethicone Suspension 30 milliLiter(s) Oral every 6 hours PRN  ascorbic acid 250 milliGRAM(s) Oral daily  barium sulfate 0.1% Suspension 450 milliLiter(s) Oral once  bisacodyl 5 milliGRAM(s) Oral at bedtime  docusate sodium 100 milliGRAM(s) Oral daily  DULoxetine 60 milliGRAM(s) Oral daily  ferrous    sulfate 325 milliGRAM(s) Oral daily  melatonin 5 milliGRAM(s) Oral at bedtime  pantoprazole  Injectable 40 milliGRAM(s) IV Push two times a day  senna 1 Tablet(s) Oral at bedtime      Vital Signs Last 24 Hrs  T(C): 36.4 (02 Jun 2018 05:35), Max: 36.8 (01 Jun 2018 17:54)  T(F): 97.6 (02 Jun 2018 05:35), Max: 98.2 (01 Jun 2018 17:54)  HR: 67 (02 Jun 2018 05:35) (63 - 91)  BP: 114/66 (02 Jun 2018 05:35) (114/66 - 162/65)  BP(mean): --  RR: 17 (02 Jun 2018 05:35) (17 - 18)  SpO2: 98% (02 Jun 2018 05:35) (95% - 100%)    PHYSICAL EXAM:      Constitutional:nad    Respiratory:cta    Cardiovascular:[id number]    Gastrointestinal:soft nt bs                            9.6    7.7   )-----------( 367      ( 01 Jun 2018 13:03 )             29.3       06-01    139  |  102  |  32<H>  ----------------------------<  106<H>  4.0   |  25  |  0.91    Ca    8.7      01 Jun 2018 03:33  Mg     2.2     06-01    TPro  6.1  /  Alb  3.4  /  TBili  0.2  /  DBili  <0.2  /  AST  20  /  ALT  14  /  AlkPhos  69  06-01      A/P  ct a/p  iv ppi  egd on monday  monitor h/h

## 2018-06-02 NOTE — PROGRESS NOTE ADULT - PROBLEM SELECTOR PLAN 1
Normocytic, normochromatic. Hgb 6.8 on admission. Not tachycardic, or hypotensive likely not acute GIB. Recently s/p EGD and colonoscopy with diverticulosis without evidence of acute bleed; pyloric mass biopsied and normal intestinal mucosa. Iron studies likely mixed iron deficiency and ACD. Hemolysis labs wnl.  - transfuse 1u prbcs for Hgb>7; unable to reach HCP Kaylin Martin, 2PC consent performed in ED  - f/u fecal occult  - PPI IV bid for now  - f/u orthostatics  - would start iron supplementation  - GI consult Dr. Quezada in am Normocytic, normochromatic. Hgb 6.8 on admission. Not tachycardic, or hypotensive likely not acute GIB. Recently s/p EGD with pyloric mass and colonoscopy with diverticulosis without evidence of acute bleed; pyloric mass biopsied and normal intestinal mucosa. Iron studies likely mixed iron deficiency and ACD. Hemolysis labs wnl.  - transfuse 1u prbcs for Hgb>7; unable to reach HCP Kaylin Martin, 2PC consent performed in ED  - active T&S  - f/u fecal occult  - c/w PPI IV BID  - c/w iron supplementation  - GI (Dr. Quezada) following, rec CT A/P non con given patient's anaphylactic iodine allergy, will f/u result and plan for scope on Mon

## 2018-06-03 LAB
ANION GAP SERPL CALC-SCNC: 10 MMOL/L — SIGNIFICANT CHANGE UP (ref 5–17)
BLD GP AB SCN SERPL QL: NEGATIVE — SIGNIFICANT CHANGE UP
BUN SERPL-MCNC: 29 MG/DL — HIGH (ref 7–23)
CALCIUM SERPL-MCNC: 8.9 MG/DL — SIGNIFICANT CHANGE UP (ref 8.4–10.5)
CHLORIDE SERPL-SCNC: 105 MMOL/L — SIGNIFICANT CHANGE UP (ref 96–108)
CO2 SERPL-SCNC: 27 MMOL/L — SIGNIFICANT CHANGE UP (ref 22–31)
CREAT SERPL-MCNC: 0.83 MG/DL — SIGNIFICANT CHANGE UP (ref 0.5–1.3)
GLUCOSE SERPL-MCNC: 96 MG/DL — SIGNIFICANT CHANGE UP (ref 70–99)
HCT VFR BLD CALC: 27.3 % — LOW (ref 34.5–45)
HGB BLD-MCNC: 9.2 G/DL — LOW (ref 11.5–15.5)
MAGNESIUM SERPL-MCNC: 2.2 MG/DL — SIGNIFICANT CHANGE UP (ref 1.6–2.6)
MCHC RBC-ENTMCNC: 28.6 PG — SIGNIFICANT CHANGE UP (ref 27–34)
MCHC RBC-ENTMCNC: 33.7 G/DL — SIGNIFICANT CHANGE UP (ref 32–36)
MCV RBC AUTO: 84.8 FL — SIGNIFICANT CHANGE UP (ref 80–100)
OB PNL STL: POSITIVE
PLATELET # BLD AUTO: 394 K/UL — SIGNIFICANT CHANGE UP (ref 150–400)
POTASSIUM SERPL-MCNC: 4.2 MMOL/L — SIGNIFICANT CHANGE UP (ref 3.5–5.3)
POTASSIUM SERPL-SCNC: 4.2 MMOL/L — SIGNIFICANT CHANGE UP (ref 3.5–5.3)
RBC # BLD: 3.22 M/UL — LOW (ref 3.8–5.2)
RBC # FLD: 15.1 % — SIGNIFICANT CHANGE UP (ref 10.3–16.9)
RH IG SCN BLD-IMP: POSITIVE — SIGNIFICANT CHANGE UP
SODIUM SERPL-SCNC: 142 MMOL/L — SIGNIFICANT CHANGE UP (ref 135–145)
WBC # BLD: 6.2 K/UL — SIGNIFICANT CHANGE UP (ref 3.8–10.5)
WBC # FLD AUTO: 6.2 K/UL — SIGNIFICANT CHANGE UP (ref 3.8–10.5)

## 2018-06-03 RX ADMIN — Medication 100 MILLIGRAM(S): at 11:53

## 2018-06-03 RX ADMIN — PANTOPRAZOLE SODIUM 40 MILLIGRAM(S): 20 TABLET, DELAYED RELEASE ORAL at 18:04

## 2018-06-03 RX ADMIN — Medication 325 MILLIGRAM(S): at 11:52

## 2018-06-03 RX ADMIN — DULOXETINE HYDROCHLORIDE 60 MILLIGRAM(S): 30 CAPSULE, DELAYED RELEASE ORAL at 11:52

## 2018-06-03 RX ADMIN — SENNA PLUS 1 TABLET(S): 8.6 TABLET ORAL at 22:07

## 2018-06-03 RX ADMIN — Medication 5 MILLIGRAM(S): at 22:08

## 2018-06-03 RX ADMIN — Medication 5 MILLIGRAM(S): at 22:07

## 2018-06-03 RX ADMIN — PANTOPRAZOLE SODIUM 40 MILLIGRAM(S): 20 TABLET, DELAYED RELEASE ORAL at 07:16

## 2018-06-03 RX ADMIN — Medication 250 MILLIGRAM(S): at 11:52

## 2018-06-03 NOTE — PROGRESS NOTE ADULT - SUBJECTIVE AND OBJECTIVE BOX
GERONIMO MACK    HPI:  HPI: [id number] PMH spinal stenosis, RA, HTN, Alzheimer's dementia, recent admission for anemia s/p EGD and colonoscopy (no acute bleed, pyloric mass biopsy normal), presents from [facility] with anemia 6.8. Patient AOx1, pleasant, but does not answer questions appropriately. Unable to obtain history, attempted to call HCP Kaylin Martin, no answer, information obtained from chart.    ED vitals:  T(F): 97.9  HR: 69   BP: 109/64    RR: 18   SpO2: 99%    Rectal exam without blood. Hgb 6.8. Hemolysis labs normal. (01 Jun 2018 05:00)    no acute events    Allergies    alprazolam (Other (Unknown))  chocolate (Unknown)  iodine (Anaphylaxis)  levofloxacin (Unknown)  Nuts (Unknown)  penicillin (Unknown)  shellfish (Short breath)    Intolerances        aluminum hydroxide/magnesium hydroxide/simethicone Suspension 30 milliLiter(s) Oral every 6 hours PRN  ascorbic acid 250 milliGRAM(s) Oral daily  barium sulfate 0.1% Suspension 450 milliLiter(s) Oral once  bisacodyl 5 milliGRAM(s) Oral at bedtime  docusate sodium 100 milliGRAM(s) Oral daily  DULoxetine 60 milliGRAM(s) Oral daily  ferrous    sulfate 325 milliGRAM(s) Oral daily  melatonin 5 milliGRAM(s) Oral at bedtime  pantoprazole  Injectable 40 milliGRAM(s) IV Push two times a day  senna 1 Tablet(s) Oral at bedtime      Vital Signs Last 24 Hrs  T(C): 36.6 (03 Jun 2018 08:33), Max: 36.8 (02 Jun 2018 22:18)  T(F): 97.8 (03 Jun 2018 08:33), Max: 98.2 (02 Jun 2018 22:18)  HR: 58 (03 Jun 2018 08:33) (56 - 70)  BP: 140/66 (03 Jun 2018 08:33) (118/67 - 162/74)  BP(mean): --  RR: 17 (03 Jun 2018 08:33) (17 - 20)  SpO2: 100% (03 Jun 2018 08:33) (97% - 100%)    PHYSICAL EXAM:      Constitutional:nad    Respiratory:cta    Cardiovascular:[id number]    Gastrointestinal:soft nt bs                              9.2    6.2   )-----------( 394      ( 03 Jun 2018 08:03 )             27.3       06-03    142  |  105  |  29<H>  ----------------------------<  96  4.2   |  27  |  0.83    Ca    8.9      03 Jun 2018 08:03  Mg     2.2     06-03        A/P  h/h stable after transfusion  ct showed no identifible malignancy  egd tommorow  iv ppi

## 2018-06-04 LAB
HCT VFR BLD CALC: 28.4 % — LOW (ref 34.5–45)
HGB BLD-MCNC: 9.3 G/DL — LOW (ref 11.5–15.5)
MCHC RBC-ENTMCNC: 27.5 PG — SIGNIFICANT CHANGE UP (ref 27–34)
MCHC RBC-ENTMCNC: 32.7 G/DL — SIGNIFICANT CHANGE UP (ref 32–36)
MCV RBC AUTO: 84 FL — SIGNIFICANT CHANGE UP (ref 80–100)
PLATELET # BLD AUTO: 415 K/UL — HIGH (ref 150–400)
RBC # BLD: 3.38 M/UL — LOW (ref 3.8–5.2)
RBC # FLD: 14.8 % — SIGNIFICANT CHANGE UP (ref 10.3–16.9)
WBC # BLD: 6.1 K/UL — SIGNIFICANT CHANGE UP (ref 3.8–10.5)
WBC # FLD AUTO: 6.1 K/UL — SIGNIFICANT CHANGE UP (ref 3.8–10.5)

## 2018-06-04 RX ADMIN — Medication 5 MILLIGRAM(S): at 21:29

## 2018-06-04 RX ADMIN — DULOXETINE HYDROCHLORIDE 60 MILLIGRAM(S): 30 CAPSULE, DELAYED RELEASE ORAL at 15:18

## 2018-06-04 RX ADMIN — SENNA PLUS 1 TABLET(S): 8.6 TABLET ORAL at 21:29

## 2018-06-04 RX ADMIN — Medication 325 MILLIGRAM(S): at 15:17

## 2018-06-04 RX ADMIN — PANTOPRAZOLE SODIUM 40 MILLIGRAM(S): 20 TABLET, DELAYED RELEASE ORAL at 17:34

## 2018-06-04 RX ADMIN — Medication 100 MILLIGRAM(S): at 15:18

## 2018-06-04 RX ADMIN — Medication 250 MILLIGRAM(S): at 15:17

## 2018-06-04 RX ADMIN — PANTOPRAZOLE SODIUM 40 MILLIGRAM(S): 20 TABLET, DELAYED RELEASE ORAL at 06:20

## 2018-06-04 NOTE — PROGRESS NOTE ADULT - PROBLEM SELECTOR PLAN 2
Pleasantly demented. Appears to be at baseline as I have met patient in the past.  - c/w Celexa 60mg daily  - caution w/ Seroquel as oversedation with only 12.5 on past admission; can consider 0.5 Haldol IVP if agitation and self-danger overnight

## 2018-06-04 NOTE — PROGRESS NOTE ADULT - ASSESSMENT
89F PMH spinal stenosis, RA, HTN, Alzheimer's dementia, recent admission for anemia s/p EGD and colonoscopy (no acute bleed, pyloric mass biopsy normal), presents from [facility] with anemia 6.8, admitted for w/u of possible GIB.

## 2018-06-04 NOTE — PROGRESS NOTE ADULT - PROBLEM SELECTOR PLAN 1
Normocytic, normochromatic. Hgb 6.8 on admission. Not tachycardic, or hypotensive likely not acute GIB. Recently s/p EGD with pyloric mass and colonoscopy with diverticulosis without evidence of acute bleed; pyloric mass biopsied and normal intestinal mucosa. Iron studies likely mixed iron deficiency and ACD. Hemolysis labs wnl.  - transfuse 1u prbcs for Hgb>7; unable to reach HCP Kaylin Martin, 2PC consent performed in ED  - active T&S  - f/u fecal occult  - c/w PPI IV BID  - c/w iron supplementation  - GI (Dr. Quezada) following, rec CT A/P non con given patient's anaphylactic iodine allergy, will f/u result and plan for scope on Mon

## 2018-06-04 NOTE — PROGRESS NOTE ADULT - SUBJECTIVE AND OBJECTIVE BOX
INTERVAL HPI/OVERNIGHT EVENTS:  No overnight events.   Patient for EGD today.     Due to language barrier? - no ROS obtained, but was pleasant.     VITAL SIGNS:  T(F): 97.6 (06-04-18 @ 09:06)  HR: 76 (06-04-18 @ 09:06)  BP: 130/83 (06-04-18 @ 09:06)  RR: 20 (06-04-18 @ 09:06)  SpO2: 96% (06-04-18 @ 09:06)  Wt(kg): --    PHYSICAL EXAM:  Constitutional: Patient seated comfortably in bed, of appropriate color, nutrition, and hydration.   HEENT: PERRLA, Normal Range of eye movement with no complaint of diplopia, Normal Hearing  Neck: No LAD, No JVD  Back: Normal spine flexure, No CVA tenderness  Respiratory: Normal air entry, Lungs clear to auscultation b/l w/o wheeze or crepitations.   Cardiovascular: S1 and S2 present - no additional abnormal sounds or murmurs. Normal rhythm and rate of pulse.   Gastrointestinal: BS+, soft, NT/ND  Extremities: No peripheral edema  Vascular: 2+ peripheral pulses  Neurological: A/O x 3, CN V-XII grossly intact.  Psychiatric: Normal mood, normal affect  Musculoskeletal: 5/5 strength b/l upper and lower extremities  Skin: No rashes    MEDICATIONS  (STANDING):  ascorbic acid 250 milliGRAM(s) Oral daily  bisacodyl 5 milliGRAM(s) Oral at bedtime  docusate sodium 100 milliGRAM(s) Oral daily  DULoxetine 60 milliGRAM(s) Oral daily  ferrous    sulfate 325 milliGRAM(s) Oral daily  melatonin 5 milliGRAM(s) Oral at bedtime  pantoprazole  Injectable 40 milliGRAM(s) IV Push two times a day  senna 1 Tablet(s) Oral at bedtime    MEDICATIONS  (PRN):  aluminum hydroxide/magnesium hydroxide/simethicone Suspension 30 milliLiter(s) Oral every 6 hours PRN Dyspepsia      Allergies  alprazolam (Other (Unknown))  chocolate (Unknown)  iodine (Anaphylaxis)  levofloxacin (Unknown)  Nuts (Unknown)  penicillin (Unknown)  shellfish (Short breath)      LABS:                        9.3    6.1   )-----------( 415      ( 04 Jun 2018 07:58 )             28.4     06-03    142  |  105  |  29<H>  ----------------------------<  96  4.2   |  27  |  0.83    Ca    8.9      03 Jun 2018 08:03  Mg     2.2     06-03        Urinalysis Basic - ( 02 Jun 2018 14:29 )    Color: Yellow / Appearance: Cloudy / SG: >=1.030 / pH: x  Gluc: x / Ketone: Trace mg/dL  / Bili: Negative / Urobili: 0.2 E.U./dL   Blood: x / Protein: Trace mg/dL / Nitrite: POSITIVE   Leuk Esterase: Small / RBC: < 5 /HPF / WBC Many /HPF   Sq Epi: x / Non Sq Epi: 5-10 /HPF / Bacteria: Many /HPF        RADIOLOGY & ADDITIONAL TESTS:  For EGD today.

## 2018-06-04 NOTE — PROGRESS NOTE ADULT - PROBLEM SELECTOR PLAN 5
F: no IVF  E: monitor and replete PRN  N: Regular; NPO @ midnight on Sunday for scope on Mon    Code Status: DNR/DNI  Dispo: [facility] pending further GI w/u

## 2018-06-04 NOTE — PROGRESS NOTE ADULT - SUBJECTIVE AND OBJECTIVE BOX
GERONIMO MACK    HPI:  HPI: [id number] PMH spinal stenosis, RA, HTN, Alzheimer's dementia, recent admission for anemia s/p EGD and colonoscopy (no acute bleed, pyloric mass biopsy normal), presents from [facility] with anemia 6.8. Patient AOx1, pleasant, but does not answer questions appropriately. Unable to obtain history, attempted to call HCP Kaylin Martin, no answer, information obtained from chart.    ED vitals:  T(F): 97.9  HR: 69   BP: 109/64    RR: 18   SpO2: 99%    Rectal exam without blood. Hgb 6.8. Hemolysis labs normal. (01 Jun 2018 05:00)    no acute events  h/h stable    Allergies    alprazolam (Other (Unknown))  chocolate (Unknown)  iodine (Anaphylaxis)  levofloxacin (Unknown)  Nuts (Unknown)  penicillin (Unknown)  shellfish (Short breath)    Intolerances        aluminum hydroxide/magnesium hydroxide/simethicone Suspension 30 milliLiter(s) Oral every 6 hours PRN  ascorbic acid 250 milliGRAM(s) Oral daily  bisacodyl 5 milliGRAM(s) Oral at bedtime  docusate sodium 100 milliGRAM(s) Oral daily  DULoxetine 60 milliGRAM(s) Oral daily  ferrous    sulfate 325 milliGRAM(s) Oral daily  melatonin 5 milliGRAM(s) Oral at bedtime  pantoprazole  Injectable 40 milliGRAM(s) IV Push two times a day  senna 1 Tablet(s) Oral at bedtime      Vital Signs Last 24 Hrs  T(C): 36.4 (04 Jun 2018 09:06), Max: 36.9 (03 Jun 2018 22:05)  T(F): 97.6 (04 Jun 2018 09:06), Max: 98.4 (03 Jun 2018 22:05)  HR: 76 (04 Jun 2018 09:06) (60 - 76)  BP: 130/83 (04 Jun 2018 09:06) (127/69 - 147/82)  BP(mean): --  RR: 20 (04 Jun 2018 09:06) (18 - 20)  SpO2: 96% (04 Jun 2018 09:06) (96% - 100%)    PHYSICAL EXAM:      Constitutional:nad    Respiratory:cta    Cardiovascular:[id number]    Gastrointestinal:soft nt bs                            9.3    6.1   )-----------( 415      ( 04 Jun 2018 07:58 )             28.4       06-03    142  |  105  |  29<H>  ----------------------------<  96  4.2   |  27  |  0.83    Ca    8.9      03 Jun 2018 08:03  Mg     2.2     06-03        A/P  egd-see note  regular diet  ppi bid  monitor h/h

## 2018-06-04 NOTE — PROGRESS NOTE ADULT - PROBLEM SELECTOR PLAN 3
Normotensive currently  - holding losartan 25mg daily for now
Normotensive currently  - holding losartan 25mg daily for now

## 2018-06-05 VITALS
TEMPERATURE: 98 F | OXYGEN SATURATION: 96 % | RESPIRATION RATE: 16 BRPM | SYSTOLIC BLOOD PRESSURE: 136 MMHG | HEART RATE: 76 BPM | DIASTOLIC BLOOD PRESSURE: 76 MMHG

## 2018-06-05 PROCEDURE — 86850 RBC ANTIBODY SCREEN: CPT

## 2018-06-05 PROCEDURE — 80053 COMPREHEN METABOLIC PANEL: CPT

## 2018-06-05 PROCEDURE — 74176 CT ABD & PELVIS W/O CONTRAST: CPT

## 2018-06-05 PROCEDURE — 82248 BILIRUBIN DIRECT: CPT

## 2018-06-05 PROCEDURE — 85730 THROMBOPLASTIN TIME PARTIAL: CPT

## 2018-06-05 PROCEDURE — 36415 COLL VENOUS BLD VENIPUNCTURE: CPT

## 2018-06-05 PROCEDURE — 88305 TISSUE EXAM BY PATHOLOGIST: CPT

## 2018-06-05 PROCEDURE — P9016: CPT

## 2018-06-05 PROCEDURE — 85027 COMPLETE CBC AUTOMATED: CPT

## 2018-06-05 PROCEDURE — 93005 ELECTROCARDIOGRAM TRACING: CPT

## 2018-06-05 PROCEDURE — 83690 ASSAY OF LIPASE: CPT

## 2018-06-05 PROCEDURE — 85025 COMPLETE CBC W/AUTO DIFF WBC: CPT

## 2018-06-05 PROCEDURE — 85610 PROTHROMBIN TIME: CPT

## 2018-06-05 PROCEDURE — 82962 GLUCOSE BLOOD TEST: CPT

## 2018-06-05 PROCEDURE — 83735 ASSAY OF MAGNESIUM: CPT

## 2018-06-05 PROCEDURE — 86901 BLOOD TYPING SEROLOGIC RH(D): CPT

## 2018-06-05 PROCEDURE — 83010 ASSAY OF HAPTOGLOBIN QUANT: CPT

## 2018-06-05 PROCEDURE — 71045 X-RAY EXAM CHEST 1 VIEW: CPT

## 2018-06-05 PROCEDURE — 82247 BILIRUBIN TOTAL: CPT

## 2018-06-05 PROCEDURE — 85045 AUTOMATED RETICULOCYTE COUNT: CPT

## 2018-06-05 PROCEDURE — 88341 IMHCHEM/IMCYTCHM EA ADD ANTB: CPT

## 2018-06-05 PROCEDURE — 86923 COMPATIBILITY TEST ELECTRIC: CPT

## 2018-06-05 PROCEDURE — 99285 EMERGENCY DEPT VISIT HI MDM: CPT | Mod: 25

## 2018-06-05 PROCEDURE — 83550 IRON BINDING TEST: CPT

## 2018-06-05 PROCEDURE — 80048 BASIC METABOLIC PNL TOTAL CA: CPT

## 2018-06-05 PROCEDURE — 36430 TRANSFUSION BLD/BLD COMPNT: CPT

## 2018-06-05 PROCEDURE — 82728 ASSAY OF FERRITIN: CPT

## 2018-06-05 PROCEDURE — 86900 BLOOD TYPING SEROLOGIC ABO: CPT

## 2018-06-05 PROCEDURE — 81001 URINALYSIS AUTO W/SCOPE: CPT

## 2018-06-05 RX ADMIN — PANTOPRAZOLE SODIUM 40 MILLIGRAM(S): 20 TABLET, DELAYED RELEASE ORAL at 07:05

## 2018-06-05 NOTE — PROGRESS NOTE ADULT - SUBJECTIVE AND OBJECTIVE BOX
GERONIMO MACK    HPI:  HPI: [id number] PMH spinal stenosis, RA, HTN, Alzheimer's dementia, recent admission for anemia s/p EGD and colonoscopy (no acute bleed, pyloric mass biopsy normal), presents from [facility] with anemia 6.8. Patient AOx1, pleasant, but does not answer questions appropriately. Unable to obtain history, attempted to call HCP Kaylin Martin, no answer, information obtained from chart.    ED vitals:  T(F): 97.9  HR: 69   BP: 109/64    RR: 18   SpO2: 99%    Rectal exam without blood. Hgb 6.8. Hemolysis labs normal. (01 Jun 2018 05:00)    no acute events    Allergies    alprazolam (Other (Unknown))  chocolate (Unknown)  iodine (Anaphylaxis)  levofloxacin (Unknown)  Nuts (Unknown)  penicillin (Unknown)  shellfish (Short breath)    Intolerances        aluminum hydroxide/magnesium hydroxide/simethicone Suspension 30 milliLiter(s) Oral every 6 hours PRN  ascorbic acid 250 milliGRAM(s) Oral daily  bisacodyl 5 milliGRAM(s) Oral at bedtime  docusate sodium 100 milliGRAM(s) Oral daily  DULoxetine 60 milliGRAM(s) Oral daily  ferrous    sulfate 325 milliGRAM(s) Oral daily  melatonin 5 milliGRAM(s) Oral at bedtime  pantoprazole  Injectable 40 milliGRAM(s) IV Push two times a day  senna 1 Tablet(s) Oral at bedtime      Vital Signs Last 24 Hrs  T(C): 36.6 (04 Jun 2018 21:26), Max: 36.8 (04 Jun 2018 16:31)  T(F): 97.8 (04 Jun 2018 21:26), Max: 98.3 (04 Jun 2018 16:31)  HR: 80 (04 Jun 2018 21:26) (66 - 80)  BP: 149/70 (04 Jun 2018 21:26) (130/83 - 149/70)  BP(mean): --  RR: 18 (04 Jun 2018 21:26) (18 - 20)  SpO2: 96% (04 Jun 2018 21:26) (95% - 96%)    PHYSICAL EXAM:      Constitutional:nad    Respiratory:cta    Cardiovascular:[id number]    Gastrointestinal:soft nt bs                            9.3    6.1   )-----------( 415      ( 04 Jun 2018 07:58 )             28.4       06-03    142  |  105  |  29<H>  ----------------------------<  96  4.2   |  27  |  0.83    Ca    8.9      03 Jun 2018 08:03  Mg     2.2     06-03        A/P  monitor h/h  ppi bid  f/u path

## 2018-06-07 LAB — SURGICAL PATHOLOGY STUDY: SIGNIFICANT CHANGE UP

## 2018-06-08 DIAGNOSIS — K29.70 GASTRITIS, UNSPECIFIED, WITHOUT BLEEDING: ICD-10-CM

## 2018-06-08 DIAGNOSIS — M48.00 SPINAL STENOSIS, SITE UNSPECIFIED: ICD-10-CM

## 2018-06-08 DIAGNOSIS — F02.80 DEMENTIA IN OTHER DISEASES CLASSIFIED ELSEWHERE, UNSPECIFIED SEVERITY, WITHOUT BEHAVIORAL DISTURBANCE, PSYCHOTIC DISTURBANCE, MOOD DISTURBANCE, AND ANXIETY: ICD-10-CM

## 2018-06-08 DIAGNOSIS — Z91.041 RADIOGRAPHIC DYE ALLERGY STATUS: ICD-10-CM

## 2018-06-08 DIAGNOSIS — Z91.018 ALLERGY TO OTHER FOODS: ICD-10-CM

## 2018-06-08 DIAGNOSIS — K26.9 DUODENAL ULCER, UNSPECIFIED AS ACUTE OR CHRONIC, WITHOUT HEMORRHAGE OR PERFORATION: ICD-10-CM

## 2018-06-08 DIAGNOSIS — D64.9 ANEMIA, UNSPECIFIED: ICD-10-CM

## 2018-06-08 DIAGNOSIS — Z88.1 ALLERGY STATUS TO OTHER ANTIBIOTIC AGENTS STATUS: ICD-10-CM

## 2018-06-08 DIAGNOSIS — I10 ESSENTIAL (PRIMARY) HYPERTENSION: ICD-10-CM

## 2018-06-08 DIAGNOSIS — Z91.013 ALLERGY TO SEAFOOD: ICD-10-CM

## 2018-06-08 DIAGNOSIS — K57.30 DIVERTICULOSIS OF LARGE INTESTINE WITHOUT PERFORATION OR ABSCESS WITHOUT BLEEDING: ICD-10-CM

## 2018-06-08 DIAGNOSIS — D50.9 IRON DEFICIENCY ANEMIA, UNSPECIFIED: ICD-10-CM

## 2018-06-08 DIAGNOSIS — R19.5 OTHER FECAL ABNORMALITIES: ICD-10-CM

## 2018-06-08 DIAGNOSIS — Z88.0 ALLERGY STATUS TO PENICILLIN: ICD-10-CM

## 2018-06-08 DIAGNOSIS — K44.9 DIAPHRAGMATIC HERNIA WITHOUT OBSTRUCTION OR GANGRENE: ICD-10-CM

## 2018-06-08 DIAGNOSIS — M06.9 RHEUMATOID ARTHRITIS, UNSPECIFIED: ICD-10-CM

## 2018-06-08 DIAGNOSIS — G30.9 ALZHEIMER'S DISEASE, UNSPECIFIED: ICD-10-CM

## 2018-06-08 DIAGNOSIS — K22.2 ESOPHAGEAL OBSTRUCTION: ICD-10-CM

## 2018-06-08 DIAGNOSIS — D63.8 ANEMIA IN OTHER CHRONIC DISEASES CLASSIFIED ELSEWHERE: ICD-10-CM

## 2018-06-08 DIAGNOSIS — Z66 DO NOT RESUSCITATE: ICD-10-CM

## 2018-06-08 DIAGNOSIS — K57.10 DIVERTICULOSIS OF SMALL INTESTINE WITHOUT PERFORATION OR ABSCESS WITHOUT BLEEDING: ICD-10-CM

## 2018-06-08 DIAGNOSIS — K31.9 DISEASE OF STOMACH AND DUODENUM, UNSPECIFIED: ICD-10-CM

## 2018-06-22 ENCOUNTER — EMERGENCY (EMERGENCY)
Facility: HOSPITAL | Age: 83
LOS: 1 days | Discharge: HOME | End: 2018-06-22
Attending: EMERGENCY MEDICINE | Admitting: EMERGENCY MEDICINE
Payer: MEDICARE

## 2018-06-22 VITALS
TEMPERATURE: 98 F | OXYGEN SATURATION: 99 % | DIASTOLIC BLOOD PRESSURE: 63 MMHG | RESPIRATION RATE: 18 BRPM | HEART RATE: 76 BPM | SYSTOLIC BLOOD PRESSURE: 114 MMHG | WEIGHT: 149.91 LBS

## 2018-06-22 VITALS
DIASTOLIC BLOOD PRESSURE: 69 MMHG | SYSTOLIC BLOOD PRESSURE: 111 MMHG | HEART RATE: 65 BPM | RESPIRATION RATE: 18 BRPM | OXYGEN SATURATION: 98 % | TEMPERATURE: 98 F

## 2018-06-22 PROCEDURE — 90715 TDAP VACCINE 7 YRS/> IM: CPT

## 2018-06-22 PROCEDURE — 70450 CT HEAD/BRAIN W/O DYE: CPT

## 2018-06-22 PROCEDURE — 99284 EMERGENCY DEPT VISIT MOD MDM: CPT | Mod: 25

## 2018-06-22 PROCEDURE — 72125 CT NECK SPINE W/O DYE: CPT

## 2018-06-22 PROCEDURE — 90471 IMMUNIZATION ADMIN: CPT

## 2018-06-22 PROCEDURE — 12002 RPR S/N/AX/GEN/TRNK2.6-7.5CM: CPT

## 2018-06-22 PROCEDURE — 70450 CT HEAD/BRAIN W/O DYE: CPT | Mod: 26

## 2018-06-22 PROCEDURE — 72125 CT NECK SPINE W/O DYE: CPT | Mod: 26

## 2018-06-22 RX ORDER — TETANUS TOXOID, REDUCED DIPHTHERIA TOXOID AND ACELLULAR PERTUSSIS VACCINE, ADSORBED 5; 2.5; 8; 8; 2.5 [IU]/.5ML; [IU]/.5ML; UG/.5ML; UG/.5ML; UG/.5ML
0.5 SUSPENSION INTRAMUSCULAR ONCE
Qty: 0 | Refills: 0 | Status: COMPLETED | OUTPATIENT
Start: 2018-06-22 | End: 2018-06-22

## 2018-06-22 RX ORDER — BACITRACIN ZINC 500 UNIT/G
1 OINTMENT IN PACKET (EA) TOPICAL ONCE
Qty: 0 | Refills: 0 | Status: COMPLETED | OUTPATIENT
Start: 2018-06-22 | End: 2018-06-22

## 2018-06-22 RX ADMIN — Medication 1 APPLICATION(S): at 02:20

## 2018-06-22 RX ADMIN — TETANUS TOXOID, REDUCED DIPHTHERIA TOXOID AND ACELLULAR PERTUSSIS VACCINE, ADSORBED 0.5 MILLILITER(S): 5; 2.5; 8; 8; 2.5 SUSPENSION INTRAMUSCULAR at 01:22

## 2018-06-22 NOTE — ED ADULT NURSE NOTE - OBJECTIVE STATEMENT
from [facility], unwitnessed fall @ 10:30pm , with small laceration on her back of head, denies blood thinner

## 2018-06-22 NOTE — ED PROVIDER NOTE - CARE PLAN
Principal Discharge DX:	Laceration of scalp  Secondary Diagnosis:	Alzheimer disease  Secondary Diagnosis:	Fall

## 2018-06-22 NOTE — ED PROVIDER NOTE - OBJECTIVE STATEMENT
nursing home patient with fall tonight and scalp lac sustained + history limited to rehab facility notes as patient with limited contribution

## 2018-06-22 NOTE — ED PROVIDER NOTE - ATTENDING CONTRIBUTION TO CARE
89 yof pw fall, from SNF.  unwitnessed fall per NH report.  pt limited history.  denies pain to extremities.    agree w/ PA, nad, will check CT eval for traumatic injury

## 2018-06-26 DIAGNOSIS — Z91.018 ALLERGY TO OTHER FOODS: ICD-10-CM

## 2018-06-26 DIAGNOSIS — I10 ESSENTIAL (PRIMARY) HYPERTENSION: ICD-10-CM

## 2018-06-26 DIAGNOSIS — Z88.1 ALLERGY STATUS TO OTHER ANTIBIOTIC AGENTS STATUS: ICD-10-CM

## 2018-06-26 DIAGNOSIS — G30.9 ALZHEIMER'S DISEASE, UNSPECIFIED: ICD-10-CM

## 2018-06-26 DIAGNOSIS — S01.01XA LACERATION WITHOUT FOREIGN BODY OF SCALP, INITIAL ENCOUNTER: ICD-10-CM
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
## 2018-06-26 DIAGNOSIS — Z91.013 ALLERGY TO SEAFOOD: ICD-10-CM

## 2018-06-26 DIAGNOSIS — Z23 ENCOUNTER FOR IMMUNIZATION: ICD-10-CM

## 2018-06-26 DIAGNOSIS — Z88.0 ALLERGY STATUS TO PENICILLIN: ICD-10-CM
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
## 2019-04-24 NOTE — ED PROVIDER NOTE - ATTENDING CONTRIBUTION TO CARE
Sudden numbness or weakness of the face, arm, or leg, especially on one side of the body. Confusion, trouble speaking or understanding. Trouble seeing in one or both eyes. Trouble walking, dizziness, loss of balance or coordination. Severe headache.
89F alzheimers, htn, RA, sent in from NH for anemia.  pt sent in for blood transfusion.  no vomiting.  recent admission had EGD with pyloric mass.   gen- nad  heent- ncat, clear conj  cv -rrr  lungs -ctab  abd - soft, nt, nd  ext -wwp, no edema  neuro -live  pt with worsening h/h, admitted for transfusion

## 2019-09-28 NOTE — ED ADULT NURSE NOTE - MUSCULOSKELETAL ASSESSMENT
Patient:   AGUS LUGO            MRN: [id number]            FIN: [id number]               Age:   86 years     Sex:  FEMALE     :  30   Associated Diagnoses:   None   Author:   CAROL ROBERTS      Chief Complaint   _left hip fracture      History of Present Illness   86 years old  female with a history of Alzheimer's disease, hypertension who was sent from [facility] after she was complaining of left hip pain after an unwitnessed fall. Patient is under hospice care. Patient was evaluated at emergency room and was found to have left hip fracture. Orthopedic surgeon consulted and patient admitted to the hospital for  of left hip arthroplasty.   The blood culture drawn at the emergency room for tachycardia and elevated WBC came back as positive. ID started her on IV antibiotic and and surgery postponed.  Patient is under hospice care and continued to be. Hospice bed was given to the patient until discharge after surgery.  Patient was seen this morning, stable but complaining of pain.    _      Review of Systems   Constitutional:  Weakness, Decreased activity.    Eye:  Negative.    Ear/Nose/Mouth/Throat:  Negative.    Respiratory:  Negative.    Cardiovascular:  Negative.    Gastrointestinal:  Negative.    Genitourinary:  Negative.    Hematology/Lymphatics:  Negative.    Endocrine:  Negative.    Immunologic:  Negative.    Musculoskeletal:  Joint pain, Decreased range of motion.    Integumentary:  Negative.    Neurologic:  Negative.    Psychiatric:  Negative.       Health Status   Allergies:    Allergies (1) Active Reaction  No Known Medication Allergies None Documented     Current medications:    Medications (14) Active  Scheduled: (9)  ALPRAZolam 0.25 mg tab  0.25 mg 1 tab, Oral, Q Bedtime  AmLODIPine 5 mg tab  5 mg 1 tab, Oral, Daily  ceFAZolin-dextrose  1,000 mg 50 mL, IVPB, On Call  cefTRIAXone-dextrose 5%  1,000 mg 50 mL, IVPB, Daily  Escitalopram 10 mg tab  5 mg 0.5  tab, Oral, Daily  Heparin 5,000 unit/1 mL inj  5,000 unit 1 mL, Subcutaneous, Q8H  Pantoprazole 40 mg DR tab  40 mg 1 tab, Oral, Daily [before breakfast]  Vancomycin 500 mg inj  vanco trough, Misc, Once (scheduled)  vancomycin-dextrose 5%  1,500 mg 500 mL, IVPB, Q24H  Continuous: (1)  Dextrose 5% - 0.9% NaCl 1,000 mL  1,000 mL, IV, 80 mL/hr  PRN: (4)  Acetaminophen 325 mg tab  650 mg 2 tab, Oral, Q6H  Morphine 20 mg/1 mL oral conc repack  5 mg 0.25 mL, Oral, Q2H  Ondansetron 4 mg tab  4 mg 1 tab, Oral, Q6H  TraZODone 50 mg tab  50 mg 1 tab, Oral, Q Bedtime     Home medications  Home Medications (19) Active  acetaminophen 650 mg, PRN, Oral, Q6H  acetaminophen rectal 650 mg suppository (Tylenol / Feverall) 650 mg = 1 suppository, PRN, Rectal, Q6H  amLODIPine 5 mg, Oral, Daily  cranberry oral tablet See Instructions  Dulcolax Laxative rectal 10 mg suppository 10 mg = 1 suppository, PRN, Rectal, Daily  escitalopram 5 mg, Oral, Daily  fluticasone nasal 50 mcg/spray 1 spray, IntraNasal, Daily  guaiFENesin 100 mg, PRN, Oral, Q6H  Levsin SL 0.125 mg tablet 0.125 mg = 1 tab, PRN, SL, Q4H  LORazepam oral 0.5 mg tablet 0.5 mg = 1 tab, PRN, Oral, Q6H  Maalox Advanced Regular Strength (Maalox Plus / Mylanta) oral 200-200-20 mg/5 mL suspension , PRN, Oral, Q6H  morphine oral 100 mg/5 mL concentrate (Roxanol) , Oral, Q2H  prochlorperazine oral 10 mg tablet (Compazine) 10 mg = 1 tab, PRN, Oral, Q6H  Protonix oral 40 mg DR tablet 40 mg = 1 tab, Oral, Daily [before breakfast]  rivastigmine topical 9.5 mg/24 hr daily patch 1 patch, TransDermal, Daily  traMADol 50 mg, PRN, Oral, BID  traZODone oral 50 mg tablet (Desyrel) 50 mg = 1 tab, PRN, Oral, Q Bedtime  Xanax 0.25 mg, Oral, Q Bedtime  Zofran oral 4 mg tablet 4 mg, PRN, Oral, Q6H           Histories   Past Medical History: Problem List / Past Medical History   Alzheimer disease  Breast cancer in female  Depression  HTN (hypertension)  Insomnia  Psychosis  Uterovaginal  prolapse  Weakness     Social History        Alcohol  Details: Use: None.  Home/Environment  Details: Living Situation: Extended Care Facility.  Ambulation: Required Assistance.  Bathing: Total Assist.  Dressing: Total Assist.  Driving: Not Performed.  Eating: Required Assistance.  Grooming: Total Assist.  Preparing Meal: Total Assist.  Taking Meds: Total Assist.  Toileting: Total Assist.  Transfers: Total Assist.  Substance Abuse  Details: Use: None.  Tobacco  Details: Smoked/Smokeless Tobacco Last 30 Days: No.  Use: Former smoker.  Type: Cigarettes.; Comment(s): quit in 1950's  .     Family history  FATHER: [place] - Cancer  SISTER: [place] - Cancer  BROTHER: [place] - Cancer    Procedure history  No qualifying data available.         Physical Examination   VS/Measurements        Vitals between:   08-AUG-2017 07:58:36   TO   09-AUG-2017 07:58:36                   LAST RESULT MINIMUM MAXIMUM  Temperature 36.5 36.4 36.6  Heart Rate 83 40 103  Respiratory Rate 16 16 16  NISBP           154 144 165  NIDBP           69 69 78  SpO2                    98 93 98  ,    Dosing Weight:   52.3 kg    08/09/2017 01:34  Most Recent Clinical Weight:   52.3  kg    08/09/2017 01:34   ,   NO VENTILATORS QUALIFIED      Intake and Output     NO DATA QUALIFIED FOR THIS ENCOUNTER IN THE PAST 24 HOURS.      General:  Alert and oriented, No acute distress.    Eye:  Pupils are equal, round and reactive to light, Extraocular movements are intact, Normal conjunctiva, Vision unchanged.    HENT:  Normocephalic, Tympanic membranes are clear, Normal hearing, Oral mucosa is moist, No pharyngeal erythema.    Neck:  Supple, Non-tender, No carotid bruit, No jugular venous distention, No lymphadenopathy.    Respiratory:  Lungs are clear to auscultation, Respirations are non-labored, Breath sounds are equal, No chest wall tenderness.    Cardiovascular:  Normal rate, Regular rhythm, No murmur, Good pulses equal in all extremities, No edema.    Gastrointestinal:  Soft,  Non-tender, Non-distended, Normal bowel sounds, No organomegaly.    Genitourinary:  No costovertebral angle tenderness, No inguinal tenderness, No lesions.    Lymphatics:  No lymphadenopathy neck, axilla, groin.    Musculoskeletal     Painful and decrease range of motion.     Integumentary:  Intact.    Neurologic:  Alert, Oriented, No focal deficits.    Cognition and Speech:  Oriented.    Psychiatric:  Cooperative.       Health Maintenance   Immunizations:  Up to date per patient.       Review / Management   Results review:       No Qualifying Labs are resulted on this patient in the last 24 hours  .    Radiology results                       Lines and Tubes:    LINES  Peripheral Intravenous Hand Right   Gauge: 22   Charted: 08/08/17 21:00  Inserted: 08/07/17   Days Since Insertion: 1 days  Indication of Use: Hydration, IVPB  Peripheral Intravenous Antecubital Right   Gauge: 20   Charted: 08/08/17 21:00  Inserted: 08/07/17   Days Since Insertion: 1 days  Indication of Use: Hydration, IVPB    DRAINS AND TUBES  Urinary Catheter   Type: Cook   Cath Size: 16 Fr.   Charted: 08/08/17 21:00  Inserted: 08/06/17   Days Since Insertion: 2 days  Usage: Prolonged Immobilization - Harm to a Surgical Site/Fracture   .      Assessment:  _#1 left hip fracture  #2 Alzheimer's disease  #3 hypertension  #4 hyperleukocytosis  #5  unsteady gait  #6 elevated troponin    Plan:  _Patient is admitted to inpatient under hospice care with Ortho on consult   Continue all her home medications  . Diet  Bedrest  Heparin 5000 every 8 for DVT prophylaxis  Repeat CBC BMP this morning  Awaiting  ID clearance for surgery               Electronically Signed On 08/09/2017 08:05  __________________________________________________   CAROL ROBERTS     - - -

## 2019-12-01 NOTE — ED ADULT NURSE NOTE - NS ED NURSE LEVEL OF CONSCIOUSNESS MENTAL STATUS
HPI:  89 Yo F with PMH of HTn, HLD, Paroxysmal Afib, CHB s/p PPM ([employer] PPM), Hip fracture s/p surgical correction, CHF (unknown EF) came with pain in the R leg after she had a fall at home. She lives alone with HHA 24x7 at home and walks with a walker. Aid was helping her with the dress while she suddenly sat down. She denies lightheadedness weakness, dizziness, blurry vision, loss of balance, palpitations before the fall. States that while sitting down her leg tripped and she felt pain. According to son, she complained pain when EMS was placing her on the stretcher. He also states that aid has not been taking good care of her and has been having few incidents for the past few months. Recently she hit her L great toe and injured it while walking at home. She visits her cardiologist and PCP frequently and everything has been well. Patient is mildly demented and her h/o is not completely reliable. (28 Nov 2019 19:47)    patient seen and evaluated at bedside offers no acute complaints.  right leg pain controlled better with a splint.    MEDICATIONS  (STANDING):  allopurinol 100 milliGRAM(s) Oral daily  artificial  tears Solution 1 Drop(s) Both EYES two times a day  aspirin  chewable 81 milliGRAM(s) Oral daily  atorvastatin 20 milliGRAM(s) Oral daily  carvedilol 12.5 milliGRAM(s) Oral every 12 hours  famotidine    Tablet 20 milliGRAM(s) Oral daily  heparin  Injectable 5000 Unit(s) SubCutaneous every 12 hours  iron sucrose IVPB 100 milliGRAM(s) IV Intermittent every 24 hours  sertraline 25 milliGRAM(s) Oral at bedtime      No Known Allergies                          9.8    7.07  )-----------( 218      ( 30 Nov 2019 07:51 )             31.4     30 Nov 2019 07:51    144    |  114    |  41     ----------------------------<  113    4.4     |  25     |  1.66     Ca    8.3        30 Nov 2019 07:51      Vital Signs Last 24 Hrs  T(C): 36.4 (12-01-19 @ 11:29), Max: 37 (11-30-19 @ 23:42)  T(F): 97.6 (12-01-19 @ 11:29), Max: 98.6 (11-30-19 @ 23:42)  HR: 74 (12-01-19 @ 11:29) (66 - 84)  BP: 167/79 (12-01-19 @ 11:29) (121/74 - 186/83)  BP(mean): --  RR: 18 (12-01-19 @ 11:29) (18 - 18)  SpO2: 93% (12-01-19 @ 11:29) (93% - 95%)    Imaging:   < from: Xray Tibia + Fibula 2 Views, Right (12.01.19 @ 11:17) >  Prior imaging showed fracture of the upper shaft of the fibula and lower   shaft of the tibia.    Patient has been immobilized.    4 images.    Alignments are unchanged.    The lower end of a femoral intramedullary rebel is noted and arterial   calcifications are seen.    IMPRESSION: Splinting.    < end of copied text >      Physical Exam  Gen: Nad  R/L LE: Skin intact, splint intact, toes mobile with good cap refill, SILT.    A/P:  right tib-fib fracture  - case d/w Dr. Jackson and Dr. Lin  - given the history of severe Aortic Stenosis and high medical risk for post op surgical complications  after a lengthy discussion with the patients HCP - Vic Kee at [phone] decision has been made to proceed with conservative management at this time.  - Pain control prn  - NWB RLE in splint, keep c/d/I, cane/crutches/walker as needed  - Ice/elevation  - Follow up with Dr. Jackson in 1 week, call [phone] for appointment Cooperative/Awake/Alert

## 2020-07-14 NOTE — ED PROVIDER NOTE - PR
Follow-up with orthopedic doctor provided to you as soon as possible or see your primary care physician within the next 1-2 days  Return to the emergency department for any new or worsening or concerning symptoms  132

## 2022-08-31 NOTE — H&P ADULT - PROBLEM SELECTOR PLAN 3
Protocol failed    Pt  Has an appointment 09/01/2022( tomorrow)     Last refill was by outside provider .     Please advice ok to refill?           Normotensive currently  - holding losartan 25mg daily; can restart if hgb corrects appropriately Normotensive currently  - holding losartan 25mg daily for now

## 2023-09-07 NOTE — CONSULT NOTE ADULT - ASSESSMENT
89F PMH spinal stenosis, RA, HTN, Alzheimer's presented to [facility] from outpatient clinic with low hemoglobin, found to have Hb 7.1 with positive FOBT, scheduled for colonoscopy on Monday.     Problem/Plan - 1:  ·  Problem: Anemia.  Plan: -Hb 7.1 on admission in setting of positive FOBT; evaluated by Dr. Quezada in ED who scheduled patient for colonoscopy on Monday however due to inadequate prep will be done Wednesday  -[facility] prep tonight. NPO at 12AM  -protonix 40 PO daily per Dr. 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
negative

## 2024-09-19 NOTE — H&P ADULT - PROBLEM SELECTOR PLAN 2
Can you contact patient and triage her symptoms?  She either needs to be seen in person in clinic, or urgent care/ER, depending on her symptoms.   Pleasantly demented. Appears to be at baseline as I have met patient in the past.  - c/w celexa 60mg daily  - caution with seroquel as patient was oversedated with 12.5 only; may need sitter overnight